# Patient Record
Sex: MALE | Race: WHITE | NOT HISPANIC OR LATINO | Employment: UNEMPLOYED | URBAN - NONMETROPOLITAN AREA
[De-identification: names, ages, dates, MRNs, and addresses within clinical notes are randomized per-mention and may not be internally consistent; named-entity substitution may affect disease eponyms.]

---

## 2022-01-07 ENCOUNTER — HOSPITAL ENCOUNTER (EMERGENCY)
Facility: HOSPITAL | Age: 37
Discharge: HOME | End: 2022-01-07
Attending: STUDENT IN AN ORGANIZED HEALTH CARE EDUCATION/TRAINING PROGRAM | Admitting: STUDENT IN AN ORGANIZED HEALTH CARE EDUCATION/TRAINING PROGRAM

## 2022-01-07 VITALS
TEMPERATURE: 97.8 F | WEIGHT: 150 LBS | DIASTOLIC BLOOD PRESSURE: 88 MMHG | SYSTOLIC BLOOD PRESSURE: 139 MMHG | OXYGEN SATURATION: 100 % | RESPIRATION RATE: 18 BRPM | BODY MASS INDEX: 20.32 KG/M2 | HEART RATE: 112 BPM | HEIGHT: 72 IN

## 2022-01-07 DIAGNOSIS — R44.0 AUDITORY HALLUCINATIONS: Primary | ICD-10-CM

## 2022-01-07 DIAGNOSIS — R45.86 EMOTIONAL LABILITY: ICD-10-CM

## 2022-01-07 PROCEDURE — 99284 EMERGENCY DEPT VISIT MOD MDM: CPT
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
## 2022-01-07 NOTE — NURSING NOTE
Called and provided intake information to Dr Brasher .discharge orders received.RBVOx2. Patient and ED provider aware.

## 2022-01-07 NOTE — DISCHARGE INSTRUCTIONS
Please follow-up with outpatient psychiatry as discussed.  Denies a date return here for any new onset or worsening concerns.

## 2022-01-07 NOTE — NURSING NOTE
Full intake assessment completed Patient presented to ED desire information on outpatient services provided by [facility].Patient denies SI,HI,AVH,alcohol and drugs reported poor sleep and appetite. He said that when he was 14 years old he was told that he could have schizoaffective disorder.In the past he has has had AVH.His parents asked him to come to the hospital today for help.He does not want to have labs performed today and instead elected to go home Educated on the benefits of seeking help verbalized understanding.

## 2022-01-12 ENCOUNTER — OFFICE VISIT (OUTPATIENT)
Dept: PSYCHIATRY | Facility: CLINIC | Age: 37
End: 2022-01-12

## 2022-01-12 VITALS
HEIGHT: 72 IN | DIASTOLIC BLOOD PRESSURE: 87 MMHG | HEART RATE: 96 BPM | OXYGEN SATURATION: 97 % | WEIGHT: 148.6 LBS | TEMPERATURE: 97.1 F | SYSTOLIC BLOOD PRESSURE: 155 MMHG | BODY MASS INDEX: 20.13 KG/M2

## 2022-01-12 DIAGNOSIS — F25.0 SCHIZOAFFECTIVE DISORDER, BIPOLAR TYPE: Primary | ICD-10-CM

## 2022-01-12 DIAGNOSIS — F51.5 NIGHTMARES: ICD-10-CM

## 2022-01-12 PROCEDURE — 90792 PSYCH DIAG EVAL W/MED SRVCS: CPT

## 2022-01-12 RX ORDER — PRAZOSIN HYDROCHLORIDE 1 MG/1
1 CAPSULE ORAL NIGHTLY
Qty: 30 CAPSULE | Refills: 0 | Status: SHIPPED | OUTPATIENT
Start: 2022-01-12 | End: 2022-01-19

## 2022-01-12 RX ORDER — ARIPIPRAZOLE 5 MG/1
5 TABLET ORAL DAILY
Qty: 7 TABLET | Refills: 0 | Status: SHIPPED | OUTPATIENT
Start: 2022-01-12 | End: 2022-01-19

## 2022-01-13 NOTE — PROGRESS NOTES
"Subjective   Kenney Jacob is a 36 y.o. male who is here today for initial appointment to evaluate for medication options. Patients Father during appointment with patients permission.     Chief Complaint:  Depression, visual/auditory hallucinations, paranoia    HPI:  History of Present Illness   Patient identifies that he was diagnosed with schizoaffective disorder prior to the 2009 by a psychiatrist that was working for an \"oragnization.\"  Patient identifies that he has been utilizing nutritional supplements: \"Adaptogens and neurotropics to self medicate.\"  Patient identifies that he first noted depressive symptomology as a child and that his severity of depression varies at times.  He endorses current  insomnia, increased irritability, increased fatigue, decreased self-care, dysphoria, anhedonia, avolition.  He identifies that his worst depressive states he has went 7+ days without showering.  Patient unable to rate depression on a scale of 0-10 with 10 being the worst.  Patient identifies that he experiences anxiety and first noted that this began as a teen.  He identifies that he worries over things that he cannot change and he is always expecting the worst to occur.  Patient unable to place anxiety on a scale of 0-10 with 10 being worst.  Patient endorses development of manic-like symptomology that occurs during substance use and sobriety.  He identifies that his depressive is still more prominent however he does note a significant change in his behavior, his speech, and impulsivity.  Patient endorses auditory hallucinations.  Patient states, \"I am able to recognize that they are not real and label it.\"  Patient identifies that over time he has noticed that it is harder for him to differentiate and finds himself \" in a repetitive loop of conversation.\"  Patient's father also agrees with this increased conversation.  He endorses visual hallucinations that have gotten worse over the last 3 weeks.  Patient states, " "\"I have always noticed self peripherally however now I have started to notice ripples and textures like things are just not real, but I note that she just my mind.\"  He endorses paranoia.  His father identifies that he believes his paranoia has gotten significantly worse in the last 3 weeks.  He states, \"I feel like they are out to get me...  Or watching.\"  Patient's father states, \"it is like no matter what someone does to them he takes it the wrong way and then thinks that they are out to get him.\"  It is noted during interview patient at times during conversation is looking from side to side and behind him.  Patient endorses variant sleep patterns.  He identifies that some nights he sleeps 5 to 6 hours uninterrupted and some nights it is 2 to 3 hours.  Patient father reports increased social isolation.  Patient's father states that they have noted a significant change in behavior over the last 3 weeks stating \"it is like a daily roller coaster, some nights he is up all night sorting bolts in the garage, it took 2 hours to get him out of his room this morning to come to this appointment.\"  Patient states that the nightmares are the worst and that they are causing him significant distress and the inability to fall back asleep because he does not want to have another nightmare.  Patient reports that he is eating at least 2 meals per day however does note that he prefers to eat snack foods such as M&Ms.  Patient endorses that he has struggled significantly with anger and irritability throughout his entire life.  Patient states that he believes \" I have caused more difficulty on my mind and have tried to use drug forearms on line to help increase processing speed focusing on dopamine and norepinephrine but of not paid attention to FREYA... I have used elemental lithium which was not consistent...  I think I have underlying autoimmune disorders that makes this stuff worse.\"  Patient denies obsessions and compulsions.  " "Patient endorses previous history of trauma however does not elaborate further and does not wish to proceed at this time.  Patient denies self-harm.  Patient does endorse difficulty with attention and focus.  Patient denies any command hallucinations.  Patient adamantly denies SI and HI.    Past Psych History: Patient identifies that he was previously diagnosed with schizoaffective disorder by a psychiatrist or psychologist that was employed by the \" organization.\"  Unable to provide name provider or location.      Previous Psych Meds: Patient identifies that he has previously been placed on lithium    Substance Abuse: Patient endorses a significant history of substance use that has occurred intermittently throughout adulthood.  Patient is unable to provide specific durations or dates of use for noted substances.  Patient does endorse that in 2009 he was utilizing IV cocaine and heroin.  He identifies that he had a 6-month period alcohol abuse.  He endorses a intermittent 2-month duration of methamphetamine use.  Patient endorses current nicotine use.  Patient identifies that he has been's sober approximately 5 months.  Patient advised to quit utilizing his supplements 3 weeks ago.    Social History:    Patient was born in [place] to biological mom and dad.  Patient has has 10 brothers and sisters.  Patient did not graduate high school.  Patient has never been .  Patient has no children.  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  Patient moved from [place] 3 months ago to live with his mom and dad as they noted \" his worsening.\"  Patient identifies that he has previously been involved in \" and organization.\"  Patient endorses previous occurrence of incarcerations with unknown causes.  Patient denies any current pending legal matters.    Family Psychiatric History:  family history includes Bipolar disorder in his paternal uncle.    Medical/Surgical History:  Past " Medical History:   Diagnosis Date   • ADHD (attention deficit hyperactivity disorder)    • Anemia    • Anxiety    • Depression    • Schizoaffective disorder (HCC)    • Seizures (HCC)     Reported during times of substance use   • Substance abuse (HCC)      Past Surgical History:   Procedure Laterality Date   • FRACTURE SURGERY         Allergies   Allergen Reactions   • Acetaminophen Palpitations       Current Medications:   Current Outpatient Medications   Medication Sig Dispense Refill   • ARIPiprazole (Abilify) 5 MG tablet Take 1 tablet by mouth Daily for 30 days. 7 tablet 0   • prazosin (MINIPRESS) 1 MG capsule Take 1 capsule by mouth Every Night. 30 capsule 0     No current facility-administered medications for this visit.         Review of Systems   Constitutional: Positive for activity change, appetite change and fatigue.   HENT: Negative for drooling and sore throat.    Eyes: Negative for redness and visual disturbance.   Respiratory: Negative for chest tightness and shortness of breath.    Cardiovascular: Negative for chest pain and palpitations.   Gastrointestinal: Negative for abdominal pain and nausea.   Endocrine: Negative for polydipsia and polyuria.   Genitourinary: Negative for frequency and urgency.   Musculoskeletal: Negative for back pain and joint swelling.   Skin: Negative for pallor and rash.   Allergic/Immunologic: Negative for environmental allergies and immunocompromised state.   Neurological: Negative for dizziness, tremors, seizures, syncope, facial asymmetry, speech difficulty, weakness, light-headedness, numbness and headaches.   Hematological: Negative for adenopathy. Does not bruise/bleed easily.   Psychiatric/Behavioral: Positive for agitation, decreased concentration, dysphoric mood, hallucinations and sleep disturbance. Negative for behavioral problems, confusion, self-injury and suicidal ideas. The patient is nervous/anxious. The patient is not hyperactive.     denies HEENT,  "cardiovascular, respiratory, liver, renal, GI/, endocrine, neuro, DERM, hematology, immunology, musculoskeletal disorders.    Objective   Physical Exam  Vitals reviewed.   Constitutional:       Appearance: Normal appearance. He is normal weight.   HENT:      Nose: Nose normal.      Mouth/Throat:      Mouth: Mucous membranes are moist.      Pharynx: Oropharynx is clear.   Eyes:      Extraocular Movements: Extraocular movements intact.      Pupils: Pupils are equal, round, and reactive to light.   Cardiovascular:      Rate and Rhythm: Normal rate.   Pulmonary:      Effort: Pulmonary effort is normal.   Abdominal:      General: Abdomen is flat.   Musculoskeletal:         General: Normal range of motion.      Cervical back: Normal range of motion.   Skin:     General: Skin is warm and dry.   Neurological:      General: No focal deficit present.      Mental Status: He is alert and oriented to person, place, and time. Mental status is at baseline.   Psychiatric:         Attention and Perception: He perceives auditory and visual hallucinations.         Mood and Affect: Mood is anxious. Affect is labile.         Speech: Speech is tangential.         Behavior: Behavior is agitated. Behavior is cooperative.         Thought Content: Thought content is paranoid and delusional. Thought content does not include homicidal or suicidal ideation. Thought content does not include homicidal or suicidal plan.         Cognition and Memory: Cognition and memory normal.         Judgment: Judgment is impulsive.       Blood pressure 155/87, pulse 96, temperature 97.1 °F (36.2 °C), height 182.9 cm (72\"), weight 67.4 kg (148 lb 9.6 oz), SpO2 97 %.    Mental Status Exam:   Hygiene:   fair  Cooperation:  Suspicious  Eye Contact:  Poor  Psychomotor Behavior:  Restless  Affect:  Restricted  Hopelessness: Denies  Speech:  Rambling  Thought Process:  Disorganized and Tangential  Thought Content:  Normal and Mood congruent  Suicidal:  " None  Homicidal:  None  Hallucinations:  Auditory and Visual  Delusion:  Paranoid  Memory:  Intact  Orientation:  Person, Place, Time and Situation  Reliability:  fair  Insight:  Fair  Judgement:  Fair  Impulse Control:  Fair  Physical/Medical Issues:  No       Short-term goals: Patient will be compliant with clinic appointments.  Patient will be engaged in therapy, medication compliant with minimal side effects. Patient  will report decrease of symptoms and frequency.    Long-term goals: Patient will have minimal symptoms of  with continued medication management. Patient will be compliant with treatment and appointments.     Strengths: Support  Weaknesses: coping skills    PHQ-9 Depression Screening  Little interest or pleasure in doing things? 2   Feeling down, depressed, or hopeless? 2   Trouble falling or staying asleep, or sleeping too much? 3   Feeling tired or having little energy? 0   Poor appetite or overeating? 2   Feeling bad about yourself - or that you are a failure or have let yourself or your family down?     Trouble concentrating on things, such as reading the newspaper or watching television? 3   Moving or speaking so slowly that other people could have noticed? Or the opposite - being so fidgety or restless that you have been moving around a lot more than usual? 3   Thoughts that you would be better off dead, or of hurting yourself in some way? 0   PHQ-9 Total Score 15   If you checked off any problems, how difficult have these problems made it for you to do your work, take care of things at home, or get along with other people? Extremely dIfficult       MINO-7  Feeling nervous, anxious or on edge: More than half the days  Not being able to stop or control worrying: More than half the days  Worrying too much about different things: More than half the days  Trouble Relaxing: More than half the days  Being so restless that it is hard to sit still: Nearly every day  Feeling afraid as if something awful  might happen: Several days  Becoming easily annoyed or irritable: Nearly every day  MINO 7 Total Score: 15  If you checked any problems, how difficult have these problems made it for you to do your work, take care of things at home, or get along with other people: Very difficult    Kenney Jacob  reports that he has been smoking cigarettes. He has been smoking about 0.25 packs per day. He has never used smokeless tobacco.. I have educated him on the risk of diseases from using tobacco products such as cancer, COPD, heart disease and reproductive problems.     I advised him to quit and he is not willing to quit.    I spent 3  minutes counseling the patient.            Assessment/Plan   Problems Addressed this Visit     None      Visit Diagnoses     Schizoaffective disorder, bipolar type (HCC)    -  Primary    Relevant Medications    ARIPiprazole (Abilify) 5 MG tablet    Nightmares        Relevant Medications    ARIPiprazole (Abilify) 5 MG tablet    prazosin (MINIPRESS) 1 MG capsule      Diagnoses       Codes Comments    Schizoaffective disorder, bipolar type (HCC)    -  Primary ICD-10-CM: F25.0  ICD-9-CM: 295.70     Nightmares     ICD-10-CM: F51.5  ICD-9-CM: 307.47         -Patient declines UDS at this time  -Start Abilify 5 mg p.o. daily for mood  -Start prazosin 1 mg p.o. nightly for nightmares  -Medications at the pharmacy at this time      Discussed medication options.  Discussed increased risk of elevation in serum lipids, increase in hemoglobin A1c, increase in fasting glucose, weight gain.  Discussed the need for establishing a baseline and monitoring thereafter.  Patient declines baseline lab work at this time however is agreeable to get labs and approximately 3 months.  Discussed the risks, benefits, and side effects of the medication; client acknowledged and verbally consented.  Patient is aware to contact the [facility] with any worsening of symptom.  Patient is agreeable to go to the ER or call 911  should they begin SI/HI.    SUPPORTIVE PSYCHOTHERAPY: continuing efforts to promote the therapeutic alliance, address the patient’s issues, and strengthen self awareness, insights, and coping skills.  Assisted patient in processing above session content; acknowledged and normalized patient’s thoughts, feelings, and concerns.  Applied  positive coping skills and behavior management in session.Allowed patient to freely discuss issues without interruption or judgment. Provided safe, confidential environment to facilitate the development of positive therapeutic relationship and encourage open, honest communication. Assisted patient in identifying risk factors which would indicate the need for higher level of care including thoughts to harm self or others and/or self-harming behavior and encouraged patient to contact this office, call 911, or present to the nearest emergency room should any of these events occur. Discussed crisis intervention services and means to access.  Patient adamantly and convincingly denies current suicidal or homicidal ideation or perceptual disturbance.    Return in about 1 week (around 1/19/2022), or if symptoms worsen or fail to improve, for Next scheduled follow up.          This document has been electronically signed by RAEN Frederick   January 13, 2022 17:48 EST   Errors in dictation may reflect use of voice recognition software and not all errors in transcription may have been detected prior to signing.

## 2022-01-19 ENCOUNTER — OFFICE VISIT (OUTPATIENT)
Dept: PSYCHIATRY | Facility: CLINIC | Age: 37
End: 2022-01-19

## 2022-01-19 VITALS
SYSTOLIC BLOOD PRESSURE: 136 MMHG | HEART RATE: 92 BPM | WEIGHT: 152 LBS | DIASTOLIC BLOOD PRESSURE: 84 MMHG | BODY MASS INDEX: 20.59 KG/M2 | HEIGHT: 72 IN

## 2022-01-19 DIAGNOSIS — F25.0 SCHIZOAFFECTIVE DISORDER, BIPOLAR TYPE: Primary | ICD-10-CM

## 2022-01-19 PROCEDURE — 99214 OFFICE O/P EST MOD 30 MIN: CPT

## 2022-01-19 RX ORDER — RISPERIDONE 0.25 MG/1
0.25 TABLET ORAL DAILY
Qty: 14 TABLET | Refills: 0 | Status: SHIPPED | OUTPATIENT
Start: 2022-01-19 | End: 2022-02-02 | Stop reason: SDUPTHER

## 2022-01-19 NOTE — PROGRESS NOTES
"      Subjective   Kenney Jacob is a 36 y.o. male is here today for medication management follow-up.    Chief Complaint:  Schizoaffective    History of Present Illness: Patient reports that he trialed the medications for 2 days. He reports that he believes that the Abilify caused an increase his \"systemic inflammation the following morning.\" He reports that he believes the prazosin resulted in orthostatic hypotension as he reports \"noted dizziness and vision changes upon standing that improved momentarily. He reports \"I believe that the years of substance use, head injury, and \"dendrite repair.\"  He reports continued paranoia, but reports an improvement in \"voices\" and \"manic like behavior.\" He identifies \" I had to make myself come today as I don't like the thought of any medications...however it is getting harder for me to thoughtfully control the differentiation of loop conversations.\" He reports improvement in insomnia, decrease in racing thoughts, no change in fatigue, mild improvement in self care, dysphoria, anhedonia, avolition. Patient unable to rate depression on a scale of 0-10 with 10 being the worst. He endorses continued thoughts of guilt associated about the past. He endorses worries associated with the past and of things that are out of his control.  Patient unable to place anxiety on a scale of 0-10 with 10 being worst. He reports a reduced in goal directed activity and hyper focus. He reports reduction in visual hallucinations and auditory hallucinations. He reports no change in paranoia and identifies that he is still experiencing significant deficits associated with it. He reports an improvement in sleep patterns averaging approximate 5-6 hours per night; patient continues to note the occurrence of distressing nightmares.  He reports a mild decrease in social isolation. Patient reports that he is eating at least 2 meals per day. He reports that he is \"focusing on nutritional wellness.\" Body mass " index is 20.61 kg/m². Patient denies the occurrence of panic. Patient denies obsessions and compulsions.  Patient endorses previous history of trauma however does not elaborate further and does not wish to proceed at this time.  Patient denies self-harm.  Patient does endorse difficulty with attention and focus.  Patient denies any command hallucinations.  Patient adamantly denies SI and HI.    The following portions of the patient's history were reviewed and updated as appropriate: allergies, current medications, past family history, past medical history, past social history, past surgical history and problem list.    Review of Systems   Constitutional: Positive for activity change, appetite change and fatigue.   HENT: Negative for drooling and tinnitus.    Eyes: Negative for photophobia, redness and visual disturbance.   Respiratory: Negative for chest tightness and shortness of breath.    Cardiovascular: Negative for chest pain and palpitations.   Gastrointestinal: Negative for abdominal pain, diarrhea and vomiting.   Endocrine: Negative for polydipsia and polyuria.   Genitourinary: Negative for frequency and urgency.   Musculoskeletal: Positive for arthralgias. Negative for gait problem.   Allergic/Immunologic: Negative for environmental allergies and immunocompromised state.   Neurological: Negative for dizziness, tremors, seizures, syncope, facial asymmetry, speech difficulty, weakness, light-headedness, numbness and headaches.   Hematological: Negative for adenopathy. Does not bruise/bleed easily.   Psychiatric/Behavioral: Positive for behavioral problems, decreased concentration, dysphoric mood and hallucinations. Negative for agitation, confusion, self-injury, sleep disturbance and suicidal ideas. The patient is not nervous/anxious and is not hyperactive.        Objective   Physical Exam  Vitals reviewed.   Constitutional:       Appearance: Normal appearance. He is normal weight.   HENT:      Head:  "Normocephalic.      Nose: Nose normal.      Mouth/Throat:      Mouth: Mucous membranes are moist.      Pharynx: Oropharynx is clear.   Eyes:      Extraocular Movements: Extraocular movements intact.      Conjunctiva/sclera: Conjunctivae normal.      Pupils: Pupils are equal, round, and reactive to light.   Cardiovascular:      Rate and Rhythm: Normal rate.   Pulmonary:      Effort: Pulmonary effort is normal.   Musculoskeletal:         General: Normal range of motion.      Cervical back: Normal range of motion.   Skin:     General: Skin is warm and dry.   Neurological:      General: No focal deficit present.      Mental Status: He is alert and oriented to person, place, and time. Mental status is at baseline.   Psychiatric:         Attention and Perception: Attention normal.         Mood and Affect: Mood is anxious. Affect is tearful.         Speech: Speech is tangential.         Behavior: Behavior is cooperative.         Thought Content: Thought content is paranoid. Thought content does not include homicidal or suicidal ideation. Thought content does not include homicidal or suicidal plan.         Cognition and Memory: Cognition and memory normal.         Judgment: Judgment is impulsive.       Blood pressure 136/84, pulse 92, height 182.9 cm (72\"), weight 68.9 kg (152 lb).    Medication List:   Current Outpatient Medications   Medication Sig Dispense Refill   • risperiDONE (RisperDAL) 0.25 MG tablet Take 1 tablet by mouth Daily. 14 tablet 0     No current facility-administered medications for this visit.       Mental Status Exam:   Hygiene:   fair  Cooperation:  cooperative, but suspicious  Eye Contact:  Fair  Psychomotor Behavior:  Restless  Affect:  Restricted  Hopelessness: Optimistic  Speech:  Rambling  Thought Process:  Tangential  Thought Content:  Bizarre and Mood congruent  Suicidal:  None  Homicidal:  None  Hallucinations:  Not demonstrated today  Delusion:  Paranoid  Memory:  Intact  Orientation:  Person, " Place, Time and Situation  Reliability:  fair  Insight:  Fair  Judgement:  Fair  Impulse Control:  Fair  Physical/Medical Issues:  No       Kenney Jacob  reports that he has been smoking cigarettes. He has been smoking about 0.25 packs per day. He has never used smokeless tobacco.. I have educated him on the risk of diseases from using tobacco products such as cancer, COPD and heart disease.     I advised him to quit and he is not willing to quit.    I spent 3  minutes counseling the patient.      Assessment/Plan   Problems Addressed this Visit     None      Visit Diagnoses     Schizoaffective disorder, bipolar type (HCC)    -  Primary    Relevant Medications    risperiDONE (RisperDAL) 0.25 MG tablet      Diagnoses       Codes Comments    Schizoaffective disorder, bipolar type (HCC)    -  Primary ICD-10-CM: F25.0  ICD-9-CM: 295.70           -Discontinue Abilify related to patient concern of increased systemic inflammation and patient request.  -Discontinue prazosin related to the reported occurrence of orthostatic hypotension and patient request.  -Start Risperdal 0.25 mg PO QHS for schizoaffective disorder at lowest dose and increase slowly to desired response per patient request.     Discussed medication options.  Reviewed the risks, benefits, and side effects of the medications; patient acknowledged and verbally consented.  Patient is agreeable to call the [facility].  Patient is aware to call 911 or go to the nearest ER should begin having SI/HI.       SUPPORTIVE PSYCHOTHERAPY: continuing efforts to promote the therapeutic alliance, address the patient’s issues, and strengthen self awareness, insights, and coping skills.  Assisted patient in processing above session content; acknowledged and normalized patient’s thoughts, feelings, and concerns.  Applied  positive coping skills and behavior management in session.Allowed patient to freely discuss issues without interruption or judgment. Provided safe,  confidential environment to facilitate the development of positive therapeutic relationship and encourage open, honest communication. Assisted patient in identifying risk factors which would indicate the need for higher level of care including thoughts to harm self or others and/or self-harming behavior and encouraged patient to contact this office, call 911, or present to the nearest emergency room should any of these events occur. Discussed crisis intervention services and means to access.  Patient adamantly and convincingly denies current suicidal or homicidal ideation or perceptual disturbance.    Return in about 2 weeks (around 2/2/2022), or if symptoms worsen or fail to improve, for Next scheduled follow up.            This document has been electronically signed by AREN Frederick  January 19, 2022 14:17 EST   Errors in dictation may reflect use of voice recognition software and not all errors in transcription may have been detected prior to signing.

## 2022-02-02 ENCOUNTER — OFFICE VISIT (OUTPATIENT)
Dept: PSYCHIATRY | Facility: CLINIC | Age: 37
End: 2022-02-02

## 2022-02-02 VITALS
WEIGHT: 152.6 LBS | HEART RATE: 91 BPM | DIASTOLIC BLOOD PRESSURE: 85 MMHG | TEMPERATURE: 96.6 F | HEIGHT: 72 IN | SYSTOLIC BLOOD PRESSURE: 131 MMHG | BODY MASS INDEX: 20.67 KG/M2 | OXYGEN SATURATION: 98 %

## 2022-02-02 DIAGNOSIS — F51.05 INSOMNIA DUE TO MENTAL CONDITION: Primary | ICD-10-CM

## 2022-02-02 DIAGNOSIS — F25.0 SCHIZOAFFECTIVE DISORDER, BIPOLAR TYPE: ICD-10-CM

## 2022-02-02 PROCEDURE — 99214 OFFICE O/P EST MOD 30 MIN: CPT

## 2022-02-02 RX ORDER — TRAZODONE HYDROCHLORIDE 50 MG/1
25 TABLET ORAL NIGHTLY
Qty: 30 TABLET | Refills: 0 | Status: SHIPPED | OUTPATIENT
Start: 2022-02-02 | End: 2022-03-22

## 2022-02-02 RX ORDER — RISPERIDONE 0.5 MG/1
0.25 TABLET ORAL DAILY
Qty: 14 TABLET | Refills: 0 | Status: SHIPPED | OUTPATIENT
Start: 2022-02-02 | End: 2022-03-22

## 2022-02-02 RX ORDER — DIVALPROEX SODIUM 250 MG/1
250 TABLET, EXTENDED RELEASE ORAL DAILY
Qty: 14 TABLET | Refills: 0 | Status: SHIPPED | OUTPATIENT
Start: 2022-02-02 | End: 2022-02-16 | Stop reason: SDUPTHER

## 2022-02-02 NOTE — PROGRESS NOTES
"      Subjective   Kenney Jacob is a 36 y.o. male is here today for medication management follow-up.    Chief Complaint:  Schizoaffective    History of Present Illness:   Patient reports that he has not noted any negative side effects with current regimen.  He reports that he is still utilizing home supplements of NAC and glycine.  He reports improvement mildly in auditory and visual hallucinations.  Patient reports that auditory hallucinations have decreased in severity and visual hallucinations are more noted in his peripheral vision.  He reports these hallucinations as chatter and shadows.  He states, \"I do not give a name to any of them.\"  He reports worsening in sleep pattern averaging approximately 3 hours per night.  He reports a mild improvement in irritability, but reports that when he is irritable it is typically himself.  Patient denies fatigue. Reports mild improvement in self-care, dysphoria and anhedonia and avolition. Patient unable to place anxiety on a scale of 0-10 with 10 being worst. He reports no change in paranoia and identifies that he is still experiencing significant deficits associated with it.  He reports that he is continuing to decrease social isolation and has been spending more time with his family.  Patient reports improvement in appetite averaging 2-3 meals per day. Body mass index is 20.7 kg/m². Patient denies the occurrence of panic. Patient denies obsessions and compulsions. Patient denies self-harm.  Patient does endorse difficulty with attention and focus.  Patient denies any command hallucinations.  Patient adamantly denies SI and HI.  Patient denies self-harm    The following portions of the patient's history were reviewed and updated as appropriate: allergies, current medications, past family history, past medical history, past social history, past surgical history and problem list.    Review of Systems   Constitutional: Positive for activity change, appetite change and " fatigue.   HENT: Negative for drooling and tinnitus.    Eyes: Negative for photophobia, redness and visual disturbance.   Respiratory: Negative for chest tightness and shortness of breath.    Cardiovascular: Negative for chest pain and palpitations.   Gastrointestinal: Negative for abdominal pain, diarrhea and vomiting.   Endocrine: Negative for polydipsia and polyuria.   Genitourinary: Negative for frequency and urgency.   Musculoskeletal: Positive for arthralgias. Negative for gait problem.   Allergic/Immunologic: Negative for environmental allergies and immunocompromised state.   Neurological: Negative for dizziness, tremors, seizures, syncope, facial asymmetry, speech difficulty, weakness, light-headedness, numbness and headaches.   Hematological: Negative for adenopathy. Does not bruise/bleed easily.   Psychiatric/Behavioral: Positive for behavioral problems, decreased concentration, dysphoric mood and hallucinations. Negative for agitation, confusion, self-injury, sleep disturbance and suicidal ideas. The patient is not nervous/anxious and is not hyperactive.        Objective   Physical Exam  Vitals reviewed.   Constitutional:       Appearance: Normal appearance. He is normal weight.   HENT:      Head: Normocephalic.      Nose: Nose normal.      Mouth/Throat:      Mouth: Mucous membranes are moist.      Pharynx: Oropharynx is clear.   Eyes:      Extraocular Movements: Extraocular movements intact.      Conjunctiva/sclera: Conjunctivae normal.      Pupils: Pupils are equal, round, and reactive to light.   Cardiovascular:      Rate and Rhythm: Normal rate.   Pulmonary:      Effort: Pulmonary effort is normal.   Musculoskeletal:         General: Normal range of motion.      Cervical back: Normal range of motion.   Skin:     General: Skin is warm and dry.   Neurological:      General: No focal deficit present.      Mental Status: He is alert and oriented to person, place, and time. Mental status is at baseline.  "  Psychiatric:         Attention and Perception: Attention normal.         Mood and Affect: Mood is anxious. Affect is tearful.         Speech: Speech is tangential.         Behavior: Behavior is cooperative.         Thought Content: Thought content is paranoid. Thought content does not include homicidal or suicidal ideation. Thought content does not include homicidal or suicidal plan.         Cognition and Memory: Cognition and memory normal.         Judgment: Judgment is impulsive.       Blood pressure 131/85, pulse 91, temperature 96.6 °F (35.9 °C), height 182.9 cm (72\"), weight 69.2 kg (152 lb 9.6 oz), SpO2 98 %.    Medication List:   Current Outpatient Medications   Medication Sig Dispense Refill   • risperiDONE (RisperDAL) 0.5 MG tablet Take 0.5 tablets by mouth Daily. 14 tablet 0   • divalproex (Depakote ER) 250 MG 24 hr tablet Take 1 tablet by mouth Daily for 14 days. 14 tablet 0   • traZODone (DESYREL) 50 MG tablet Take 0.5 tablets by mouth Every Night. 30 tablet 0     No current facility-administered medications for this visit.       Mental Status Exam:   Hygiene:   fair  Cooperation:  cooperative, but suspicious  Eye Contact:  Fair  Psychomotor Behavior:  Restless  Affect:  Restricted  Hopelessness: Optimistic  Speech:  Rambling  Thought Process:  Tangential  Thought Content:  Bizarre and Mood congruent  Suicidal:  None  Homicidal:  None  Hallucinations:  Not demonstrated today  Delusion:  Paranoid  Memory:  Intact  Orientation:  Person, Place, Time and Situation  Reliability:  fair  Insight:  Fair  Judgement:  Fair  Impulse Control:  Fair  Physical/Medical Issues:  No       Kenney Jacob  reports that he has been smoking cigarettes. He has been smoking about 0.25 packs per day. He has never used smokeless tobacco.. I have educated him on the risk of diseases from using tobacco products such as cancer, COPD and heart disease.     I advised him to quit and he is not willing to quit.    I spent 3  minutes " "counseling the patient.      Assessment/Plan   Problems Addressed this Visit     None      Visit Diagnoses     Insomnia due to mental condition    -  Primary    Relevant Medications    risperiDONE (RisperDAL) 0.5 MG tablet    traZODone (DESYREL) 50 MG tablet    Schizoaffective disorder, bipolar type (HCC)        Relevant Medications    risperiDONE (RisperDAL) 0.5 MG tablet    divalproex (Depakote ER) 250 MG 24 hr tablet    traZODone (DESYREL) 50 MG tablet      Diagnoses       Codes Comments    Insomnia due to mental condition    -  Primary ICD-10-CM: F51.05  ICD-9-CM: 300.9, 327.02     Schizoaffective disorder, bipolar type (HCC)     ICD-10-CM: F25.0  ICD-9-CM: 295.70             -Increase Risperdal 0.5 mg PO QHS for schizoaffective disorder at lowest dose and increase slowly to desired response per patient request.  -Start Depakote  mg PO QHS for mood at lowest dose and increase slowly to desired response per patient request.  -Start trazodone 25-50 mg PO QHS for sleep.   -Medication sent to pharmacy at this time.  -Patient declines labs at this time to establish baseline; reports that he will \"consider it next time.\"   -Medications sent to pharmacy at this time.     Discussed medication options.  Reviewed the risks, benefits, and side effects of the medications; patient acknowledged and verbally consented.  Patient is agreeable to call the [facility].  Patient is aware to call 911 or go to the nearest ER should begin having SI/HI.      SUPPORTIVE PSYCHOTHERAPY: continuing efforts to promote the therapeutic alliance, address the patient’s issues, and strengthen self awareness, insights, and coping skills.  Assisted patient in processing above session content; acknowledged and normalized patient’s thoughts, feelings, and concerns.  Applied  positive coping skills and behavior management in session.Allowed patient to freely discuss issues without interruption or judgment. Provided safe, confidential " environment to facilitate the development of positive therapeutic relationship and encourage open, honest communication. Assisted patient in identifying risk factors which would indicate the need for higher level of care including thoughts to harm self or others and/or self-harming behavior and encouraged patient to contact this office, call 911, or present to the nearest emergency room should any of these events occur. Discussed crisis intervention services and means to access.  Patient adamantly and convincingly denies current suicidal or homicidal ideation or perceptual disturbance.    Return in about 2 weeks (around 2/16/2022), or if symptoms worsen or fail to improve, for Next scheduled follow up.            This document has been electronically signed by AREN Frederick  February 2, 2022 17:22 EST   Errors in dictation may reflect use of voice recognition software and not all errors in transcription may have been detected prior to signing.

## 2022-02-04 ENCOUNTER — TELEPHONE (OUTPATIENT)
Dept: FAMILY MEDICINE CLINIC | Facility: CLINIC | Age: 37
End: 2022-02-04

## 2022-02-04 NOTE — TELEPHONE ENCOUNTER
Risperidone requires PA.  Proceed?     Submitted PA via CoverMyMeds.  Awaiting determination.     Received notification via fax from [employer], Risperidone 0.5mg has been approved 2/7/22-2/6/23.  Patient notified of determination.

## 2022-02-16 ENCOUNTER — OFFICE VISIT (OUTPATIENT)
Dept: PSYCHIATRY | Facility: CLINIC | Age: 37
End: 2022-02-16

## 2022-02-16 VITALS
WEIGHT: 153 LBS | OXYGEN SATURATION: 99 % | SYSTOLIC BLOOD PRESSURE: 145 MMHG | TEMPERATURE: 97 F | HEART RATE: 112 BPM | BODY MASS INDEX: 20.72 KG/M2 | HEIGHT: 72 IN | RESPIRATION RATE: 16 BRPM | DIASTOLIC BLOOD PRESSURE: 77 MMHG

## 2022-02-16 DIAGNOSIS — F51.05 INSOMNIA DUE TO MENTAL CONDITION: ICD-10-CM

## 2022-02-16 DIAGNOSIS — F25.0 SCHIZOAFFECTIVE DISORDER, BIPOLAR TYPE: ICD-10-CM

## 2022-02-16 PROCEDURE — 99214 OFFICE O/P EST MOD 30 MIN: CPT

## 2022-02-16 RX ORDER — DIVALPROEX SODIUM 500 MG/1
500 TABLET, EXTENDED RELEASE ORAL DAILY
Qty: 14 TABLET | Refills: 0 | Status: SHIPPED | OUTPATIENT
Start: 2022-02-16 | End: 2022-03-22

## 2022-02-16 NOTE — PROGRESS NOTES
"      Subjective   Kenney Jacob is a 36 y.o. male is here today for medication management follow-up.    Chief Complaint:  Schizoaffective    History of Present Illness:     Patient reports no negative side effects to current medication regimen.  He reports that he was able to go to the [facility] and attempt to get his 's license transitioned over for insurance purposes.  He identifies that when he got there it was full and he has scheduled an appointment to further pursue this.  He reports that he is still utilizing his home supplements of NAC and glycine.  He continues to report auditory and visual hallucinations: He reports \"chatter\" is still present but not as significant.  He reports improvement in sleep quality and duration with trazodone.  He identifies that he is averaging 7 to 8 hours plus per night without intermittent awakenings.  He reports mild improvement in irritability however identifies that there has been increase in difficulty in family dynamics at home.  He reports continued improvement in self-care.  He identifies that he did not  his risperidone as previously ordered from the pharmacy but plans to do so today.  Patient unable to place anxiety and depression on a scale of 0-10 with 10 being the worst.  He continues to endorse paranoia; however reports that he is still able to decipher that it is not real.  He reports no change in appetite averaging 1-2 meals per day. Body mass index is 20.75 kg/m².Patient denies the occurrence of panic. Patient denies obsessions and compulsions. Patient denies self-harm.  Patient continues to voice difficulty with attention and focus.  Patient denies any command hallucinations.  Patient adamantly denies SI and HI.  Patient denies self-harm    The following portions of the patient's history were reviewed and updated as appropriate: allergies, current medications, past family history, past medical history, past social history, past surgical history and " problem list.    Review of Systems   Constitutional: Positive for activity change and appetite change. Negative for fatigue.   HENT: Negative for drooling and tinnitus.    Eyes: Negative for photophobia, redness and visual disturbance.   Respiratory: Negative for chest tightness and shortness of breath.    Cardiovascular: Negative for chest pain and palpitations.   Gastrointestinal: Negative for abdominal pain, diarrhea and vomiting.   Endocrine: Negative for polydipsia and polyuria.   Genitourinary: Negative for frequency and urgency.   Musculoskeletal: Positive for arthralgias. Negative for gait problem.   Allergic/Immunologic: Negative for environmental allergies and immunocompromised state.   Neurological: Negative for dizziness, tremors, seizures, syncope, facial asymmetry, speech difficulty, weakness, light-headedness, numbness and headaches.   Hematological: Negative for adenopathy. Does not bruise/bleed easily.   Psychiatric/Behavioral: Positive for behavioral problems, decreased concentration, dysphoric mood, hallucinations and sleep disturbance. Negative for agitation, confusion, self-injury and suicidal ideas. The patient is not nervous/anxious and is not hyperactive.        Objective   Physical Exam  Vitals reviewed.   Constitutional:       Appearance: Normal appearance. He is normal weight.   HENT:      Head: Normocephalic.      Nose: Nose normal.      Mouth/Throat:      Mouth: Mucous membranes are moist.      Pharynx: Oropharynx is clear.   Eyes:      Extraocular Movements: Extraocular movements intact.      Conjunctiva/sclera: Conjunctivae normal.      Pupils: Pupils are equal, round, and reactive to light.   Cardiovascular:      Rate and Rhythm: Normal rate.   Pulmonary:      Effort: Pulmonary effort is normal.   Musculoskeletal:         General: Normal range of motion.      Cervical back: Normal range of motion.   Skin:     General: Skin is warm and dry.   Neurological:      General: No focal  "deficit present.      Mental Status: He is alert and oriented to person, place, and time. Mental status is at baseline.   Psychiatric:         Attention and Perception: Attention normal.         Mood and Affect: Mood is anxious. Affect is tearful.         Speech: Speech is tangential.         Behavior: Behavior is cooperative.         Thought Content: Thought content is paranoid. Thought content does not include homicidal or suicidal ideation. Thought content does not include homicidal or suicidal plan.         Cognition and Memory: Cognition and memory normal.         Judgment: Judgment is impulsive.       Blood pressure 145/77, pulse 112, temperature 97 °F (36.1 °C), temperature source Temporal, resp. rate 16, height 182.9 cm (72\"), weight 69.4 kg (153 lb), SpO2 99 %.    Medication List:   Current Outpatient Medications   Medication Sig Dispense Refill   • divalproex (Depakote ER) 500 MG 24 hr tablet Take 1 tablet by mouth Daily for 14 days. 14 tablet 0   • traZODone (DESYREL) 50 MG tablet Take 0.5 tablets by mouth Every Night. 30 tablet 0   • risperiDONE (RisperDAL) 0.5 MG tablet Take 0.5 tablets by mouth Daily. 14 tablet 0     No current facility-administered medications for this visit.       Mental Status Exam:   Hygiene:   fair  Cooperation:  Guarded  Eye Contact:  Fair  Psychomotor Behavior:  Restless  Affect:  Restricted  Hopelessness: Optimistic  Speech:  Rambling  Thought Process:  Tangential  Thought Content:  Bizarre and Mood congruent  Suicidal:  None  Homicidal:  None  Hallucinations:  Not demonstrated today  Delusion:  Paranoid  Memory:  Intact  Orientation:  Person, Place, Time and Situation  Reliability:  fair  Insight:  Fair  Judgement:  Fair  Impulse Control:  Fair  Physical/Medical Issues:  No       Kenney Jacob  reports that he has been smoking cigarettes. He has been smoking about 0.25 packs per day. He has never used smokeless tobacco.. I have educated him on the risk of diseases from using " tobacco products such as cancer, COPD and heart disease.     I advised him to quit and he is not willing to quit.    I spent 3  minutes counseling the patient.      Assessment/Plan   Problems Addressed this Visit     None      Visit Diagnoses     Schizoaffective disorder, bipolar type (HCC)        Relevant Medications    divalproex (Depakote ER) 500 MG 24 hr tablet    Insomnia due to mental condition          Diagnoses       Codes Comments    Schizoaffective disorder, bipolar type (HCC)     ICD-10-CM: F25.0  ICD-9-CM: 295.70     Insomnia due to mental condition     ICD-10-CM: F51.05  ICD-9-CM: 300.9, 327.02           -Continue to build therapeutic alliance and rapport  -Continue Risperdal 0.5 mg PO QHS for schizoaffective disorder at current doses patient has not began increase from previous visit..  -Increase Depakote ER to 500 mg PO QHS for mood at lowest dose and increase slowly to desired response per patient request.  -Continue trazodone 25-50 mg PO QHS as needed for sleep  -Patient declines labs at this time to establish baseline  -Medications sent to pharmacy at this time.     Discussed medication options.  Reviewed the risks, benefits, and side effects of the medications; patient acknowledged and verbally consented.  Patient is agreeable to call the [facility].  Patient is aware to call 911 or go to the nearest ER should begin having SI/HI.      SUPPORTIVE PSYCHOTHERAPY: continuing efforts to promote the therapeutic alliance, address the patient’s issues, and strengthen self awareness, insights, and coping skills.  Assisted patient in processing above session content; acknowledged and normalized patient’s thoughts, feelings, and concerns.  Applied  positive coping skills and behavior management in session.Allowed patient to freely discuss issues without interruption or judgment. Provided safe, confidential environment to facilitate the development of positive therapeutic relationship and encourage open,  honest communication. Assisted patient in identifying risk factors which would indicate the need for higher level of care including thoughts to harm self or others and/or self-harming behavior and encouraged patient to contact this office, call 911, or present to the nearest emergency room should any of these events occur. Discussed crisis intervention services and means to access.  Patient adamantly and convincingly denies current suicidal or homicidal ideation or perceptual disturbance.    Return in about 2 weeks (around 3/2/2022), or if symptoms worsen or fail to improve.            This document has been electronically signed by AREN Frederick  February 16, 2022 17:11 EST   Errors in dictation may reflect use of voice recognition software and not all errors in transcription may have been detected prior to signing.

## 2022-03-22 ENCOUNTER — OFFICE VISIT (OUTPATIENT)
Dept: PSYCHIATRY | Facility: CLINIC | Age: 37
End: 2022-03-22

## 2022-03-22 VITALS
HEART RATE: 101 BPM | SYSTOLIC BLOOD PRESSURE: 143 MMHG | OXYGEN SATURATION: 99 % | TEMPERATURE: 96.9 F | BODY MASS INDEX: 19.72 KG/M2 | DIASTOLIC BLOOD PRESSURE: 86 MMHG | WEIGHT: 145.6 LBS | HEIGHT: 72 IN

## 2022-03-22 DIAGNOSIS — F25.0 SCHIZOAFFECTIVE DISORDER, BIPOLAR TYPE: Primary | ICD-10-CM

## 2022-03-22 PROCEDURE — 99214 OFFICE O/P EST MOD 30 MIN: CPT

## 2022-03-22 RX ORDER — OLANZAPINE 5 MG/1
5 TABLET ORAL NIGHTLY
Qty: 15 TABLET | Refills: 0 | Status: SHIPPED | OUTPATIENT
Start: 2022-03-22 | End: 2022-04-21 | Stop reason: SDUPTHER

## 2022-03-22 RX ORDER — OLANZAPINE 5 MG/1
5 TABLET ORAL NIGHTLY
Qty: 15 TABLET | Refills: 0 | Status: SHIPPED | OUTPATIENT
Start: 2022-03-22 | End: 2022-03-22 | Stop reason: SDUPTHER

## 2022-03-22 RX ORDER — DIVALPROEX SODIUM 500 MG/1
500 TABLET, EXTENDED RELEASE ORAL DAILY
Qty: 15 TABLET | Refills: 0 | Status: SHIPPED | OUTPATIENT
Start: 2022-03-22 | End: 2022-04-21 | Stop reason: SDUPTHER

## 2022-03-22 NOTE — PROGRESS NOTES
"      Subjective   Kenney Jacob is a 36 y.o. male is here today for medication management follow-up.    Chief Complaint:  Schizoaffective    History of Present Illness:     Patient reports increased difficulty with strained family dynamics.  He reports that he has not been taking his medication: He reports that he did not like how the risperidone made him feel but did like the Depakote.  He reports increased feelings of mood lability, irritability, and conflict at home.  He reports worsening in sleep habits and reports that he is slept 7 hours in the last 5 days.  He reports a decrease in appetite averaging 1-2 meals per day.  8 pound weight loss noted since last encounter. Body mass index is 19.75 kg/m². He reports increased goal-directed activity on projects around his parents home.  He reports \"I have just got lots of things to get done and do.\"  He reports that financial stressors have been the cause of conflict at home.  He reports \" I know that some of the stuff that comes to mind is not real but I am having a hard time not feeling like my family is throwing angles at me.\"  Patient reports increased paranoia.  He reports heightened auditory hallucinations and sensitivity to background noise.  He continues to describe auditory hallucinations as \"chatter.\"  Patient unable to place anxiety and depression on a scale 0-10 with 10 being the worst.  Patient adamantly denies self-harm.  Patient denies visual hallucinations.  Patient denies command hallucinations.  Patient adamantly denies SI and HI.    The following portions of the patient's history were reviewed and updated as appropriate: allergies, current medications, past family history, past medical history, past social history, past surgical history and problem list.    Review of Systems   Constitutional: Positive for activity change and appetite change. Negative for fatigue and unexpected weight change.   HENT: Negative for drooling and tinnitus.    Eyes: " Negative for photophobia, redness and visual disturbance.   Respiratory: Negative for chest tightness and shortness of breath.    Cardiovascular: Negative for chest pain and palpitations.   Gastrointestinal: Negative for abdominal pain, diarrhea and vomiting.   Endocrine: Negative for polydipsia and polyuria.   Genitourinary: Negative for frequency and urgency.   Musculoskeletal: Positive for arthralgias. Negative for gait problem.   Allergic/Immunologic: Negative for environmental allergies and immunocompromised state.   Neurological: Negative for dizziness, tremors, seizures, syncope, facial asymmetry, speech difficulty, weakness, light-headedness, numbness and headaches.   Hematological: Negative for adenopathy. Does not bruise/bleed easily.   Psychiatric/Behavioral: Positive for agitation, behavioral problems, decreased concentration, dysphoric mood, hallucinations and sleep disturbance. Negative for confusion, self-injury and suicidal ideas. The patient is not nervous/anxious and is not hyperactive.        Objective   Physical Exam  Vitals reviewed.   Constitutional:       Appearance: Normal appearance. He is normal weight.   HENT:      Head: Normocephalic.      Nose: Nose normal.      Mouth/Throat:      Mouth: Mucous membranes are moist.      Pharynx: Oropharynx is clear.   Eyes:      Extraocular Movements: Extraocular movements intact.      Conjunctiva/sclera: Conjunctivae normal.      Pupils: Pupils are equal, round, and reactive to light.   Cardiovascular:      Rate and Rhythm: Normal rate.   Pulmonary:      Effort: Pulmonary effort is normal.   Musculoskeletal:         General: Normal range of motion.      Cervical back: Normal range of motion.   Skin:     General: Skin is warm and dry.   Neurological:      General: No focal deficit present.      Mental Status: He is alert and oriented to person, place, and time. Mental status is at baseline.   Psychiatric:         Attention and Perception: Attention  "normal. He perceives auditory hallucinations.         Mood and Affect: Mood is anxious. Affect is not tearful.         Speech: Speech is tangential.         Behavior: Behavior is cooperative.         Thought Content: Thought content is paranoid. Thought content does not include homicidal or suicidal ideation. Thought content does not include homicidal or suicidal plan.         Cognition and Memory: Cognition and memory normal.         Judgment: Judgment is impulsive.       Blood pressure 143/86, pulse 101, temperature 96.9 °F (36.1 °C), height 182.9 cm (72\"), weight 66 kg (145 lb 9.6 oz), SpO2 99 %.    Medication List:   Current Outpatient Medications   Medication Sig Dispense Refill   • divalproex (Depakote ER) 500 MG 24 hr tablet Take 1 tablet by mouth Daily for 15 days. 15 tablet 0   • OLANZapine (ZyPREXA) 5 MG tablet Take 1 tablet by mouth Every Night for 15 days. 15 tablet 0     No current facility-administered medications for this visit.       Mental Status Exam:   Hygiene:   fair  Cooperation:  Guarded  Eye Contact:  Fair  Psychomotor Behavior:  Restless  Affect:  Restricted  Hopelessness: Optimistic  Speech:  Rambling  Thought Process:  Tangential  Thought Content:  Bizarre and Mood congruent  Suicidal:  None  Homicidal:  None  Hallucinations:  Not demonstrated today  Delusion:  Paranoid  Memory:  Intact  Orientation:  Person, Place, Time and Situation  Reliability:  fair  Insight:  Fair  Judgement:  Fair  Impulse Control:  Fair  Physical/Medical Issues:  No       Kenney Jacob  reports that he has been smoking cigarettes. He has been smoking about 0.25 packs per day. He has never used smokeless tobacco.. I have educated him on the risk of diseases from using tobacco products such as cancer, COPD and heart disease.     I advised him to quit and he is not willing to quit.    I spent 3  minutes counseling the patient.      Assessment/Plan   Problems Addressed this Visit    None     Visit Diagnoses     " Schizoaffective disorder, bipolar type (HCC)    -  Primary    Relevant Medications    divalproex (Depakote ER) 500 MG 24 hr tablet    OLANZapine (ZyPREXA) 5 MG tablet      Diagnoses       Codes Comments    Schizoaffective disorder, bipolar type (HCC)    -  Primary ICD-10-CM: F25.0  ICD-9-CM: 295.70           -Continue to build therapeutic alliance and rapport  -Discontinue Risperdal per patient request  -Start Zyprexa 5 mg p.o. nightly for mood  -Restart Depakote ER to 500 mg PO QHS for mood per patient request, patient reports that he felt like that it was helping overall.  -Discontinue trazodone per patient request  -Patient declines labs at this time to establish baseline  -Medications sent to pharmacy at this time.     Discussed medication and psychotherapy options.  Thoroughly discussed increased risk of weight gain, increased fasting glucose, increased serum A1c, increased serum lipids, risk for abnormal muscle movements, and or weight gain with the use of SGA's.  Reviewed the risks, benefits, and side effects of the medications; patient acknowledged and verbally consented.  Patient is agreeable to call the [facility].  Patient is aware to call 911 or go to the nearest ER should begin having SI/HI.      SUPPORTIVE PSYCHOTHERAPY: continuing efforts to promote the therapeutic alliance, address the patient’s issues, and strengthen self awareness, insights, and coping skills.  Assisted patient in processing above session content; acknowledged and normalized patient’s thoughts, feelings, and concerns.  Applied  positive coping skills and behavior management in session.Allowed patient to freely discuss issues without interruption or judgment. Provided safe, confidential environment to facilitate the development of positive therapeutic relationship and encourage open, honest communication. Assisted patient in identifying risk factors which would indicate the need for higher level of care including thoughts to  harm self or others and/or self-harming behavior and encouraged patient to contact this office, call 911, or present to the nearest emergency room should any of these events occur. Discussed crisis intervention services and means to access.  Patient adamantly and convincingly denies current suicidal or homicidal ideation or perceptual disturbance.    Return in about 4 weeks (around 4/19/2022), or if symptoms worsen or fail to improve, for Next scheduled follow up.            This document has been electronically signed by AREN Frederick  March 22, 2022 15:25 EDT   Errors in dictation may reflect use of voice recognition software and not all errors in transcription may have been detected prior to signing.

## 2022-04-21 ENCOUNTER — OFFICE VISIT (OUTPATIENT)
Dept: PSYCHIATRY | Facility: CLINIC | Age: 37
End: 2022-04-21

## 2022-04-21 VITALS
HEART RATE: 93 BPM | TEMPERATURE: 97.8 F | DIASTOLIC BLOOD PRESSURE: 81 MMHG | OXYGEN SATURATION: 99 % | SYSTOLIC BLOOD PRESSURE: 118 MMHG | WEIGHT: 153.4 LBS | HEIGHT: 72 IN | BODY MASS INDEX: 20.78 KG/M2

## 2022-04-21 DIAGNOSIS — F25.0 SCHIZOAFFECTIVE DISORDER, BIPOLAR TYPE: ICD-10-CM

## 2022-04-21 PROCEDURE — 99214 OFFICE O/P EST MOD 30 MIN: CPT

## 2022-04-21 RX ORDER — DIVALPROEX SODIUM 500 MG/1
1000 TABLET, EXTENDED RELEASE ORAL DAILY
Qty: 60 TABLET | Refills: 0 | Status: SHIPPED | OUTPATIENT
Start: 2022-04-21 | End: 2022-05-11 | Stop reason: SDUPTHER

## 2022-04-21 RX ORDER — OLANZAPINE 5 MG/1
TABLET ORAL
Qty: 53 TABLET | Refills: 0 | Status: SHIPPED | OUTPATIENT
Start: 2022-04-21 | End: 2022-05-11 | Stop reason: SDUPTHER

## 2022-04-21 NOTE — PROGRESS NOTES
"      Subjective   Kenney Jacob is a 36 y.o. male is here today for medication management follow-up.    Chief Complaint:  Schizoaffective: Bipolar type    History of Present Illness:     Patient reports that he did not note negative side effects with Depakote and Zyprexa.  He reports that he did notice a significant improvement in auditory and visual hallucinations as well as paranoia when taking the Zyprexa.  He reports that he took it for approximately 7 days and then lost the bottle.  He identifies that sleep was improved and that he felt that it also helped his \"restless legs.\"  He reports that with Depakote he feels that his mood is more stable and that irritability is decreased.  He reports that since last encounter he has had a very difficult time with \"delusions and hallucinations.\"  He reports that he is often caught himself speaking to people and to animals that he is aware that are not there and then expresses great upset related to this.  He states \"my brain thinks that the real, I know that they are not real, then I catch myself responding, then I get mad because I know it is not real.\"  He reports that he has had an increase in paranoia behaviors and has felt as if he is being followed and watched.  Upon entering the room patient states \"do you have all of your recording devices off... I know you are not recording me but my brain tells me you are so I am just asking to be sure.\" He reports heightened auditory hallucinations and sensitivity to background noise.  He identifies that background noise is singing, white noise, and conversation.  He identifies that since last encounter he has stopped taking all \"adaptogens.\"  He reports that appetite has also improved.  8 pound weight gain documented since last encounter. Body mass index is 20.8 kg/m².  He identifies that miguel angel symptomology has decreased, denies depressive symptomology, reports euthymia. Patient unable to place anxiety and depression on a " scale 0-10 with 10 being the worst.  Patient adamantly denies self-harm. Patient denies command hallucinations.  Patient adamantly denies SI and HI.    The following portions of the patient's history were reviewed and updated as appropriate: allergies, current medications, past family history, past medical history, past social history, past surgical history and problem list.    Review of Systems   Constitutional: Positive for activity change and appetite change. Negative for fatigue and unexpected weight change.   HENT: Negative for drooling and tinnitus.    Eyes: Negative for photophobia, redness and visual disturbance.   Respiratory: Negative for chest tightness and shortness of breath.    Cardiovascular: Negative for chest pain and palpitations.   Gastrointestinal: Negative for abdominal pain, diarrhea and vomiting.   Endocrine: Negative for polydipsia and polyuria.   Genitourinary: Negative for frequency and urgency.   Musculoskeletal: Positive for arthralgias. Negative for gait problem.   Allergic/Immunologic: Negative for environmental allergies and immunocompromised state.   Neurological: Negative for dizziness, tremors, seizures, syncope, facial asymmetry, speech difficulty, weakness, light-headedness, numbness and headaches.   Hematological: Negative for adenopathy. Does not bruise/bleed easily.   Psychiatric/Behavioral: Positive for agitation, behavioral problems, decreased concentration, dysphoric mood, hallucinations and sleep disturbance. Negative for confusion, self-injury and suicidal ideas. The patient is not nervous/anxious and is not hyperactive.        Objective   Physical Exam  Vitals reviewed.   Constitutional:       Appearance: Normal appearance. He is normal weight.   HENT:      Head: Normocephalic.      Nose: Nose normal.      Mouth/Throat:      Mouth: Mucous membranes are moist.      Pharynx: Oropharynx is clear.   Eyes:      Extraocular Movements: Extraocular movements intact.       "Conjunctiva/sclera: Conjunctivae normal.      Pupils: Pupils are equal, round, and reactive to light.   Cardiovascular:      Rate and Rhythm: Normal rate.   Pulmonary:      Effort: Pulmonary effort is normal.   Musculoskeletal:         General: Normal range of motion.      Cervical back: Normal range of motion.   Skin:     General: Skin is warm and dry.   Neurological:      General: No focal deficit present.      Mental Status: He is alert and oriented to person, place, and time. Mental status is at baseline.   Psychiatric:         Attention and Perception: Attention normal. He perceives auditory hallucinations.         Mood and Affect: Mood is anxious. Affect is not tearful.         Speech: Speech is tangential.         Behavior: Behavior is cooperative.         Thought Content: Thought content is paranoid. Thought content does not include homicidal or suicidal ideation. Thought content does not include homicidal or suicidal plan.         Cognition and Memory: Cognition and memory normal.         Judgment: Judgment is impulsive.       Blood pressure 118/81, pulse 93, temperature 97.8 °F (36.6 °C), height 182.9 cm (72.01\"), weight 69.6 kg (153 lb 6.4 oz), SpO2 99 %.    Medication List:   Current Outpatient Medications   Medication Sig Dispense Refill   • divalproex (Depakote ER) 500 MG 24 hr tablet Take 2 tablets by mouth Daily for 30 days. 60 tablet 0   • OLANZapine (ZyPREXA) 5 MG tablet Take 1 tablet by mouth Every Night for 7 days, THEN 2 tablets Every Night for 23 days. 53 tablet 0     No current facility-administered medications for this visit.       Mental Status Exam:   Hygiene:   fair  Cooperation:  Guarded  Eye Contact:  Fair  Psychomotor Behavior:  Restless  Affect:  Restricted  Hopelessness: Optimistic  Speech:  Rambling  Thought Process:  Tangential  Thought Content:  Bizarre and Mood congruent  Suicidal:  None  Homicidal:  None  Hallucinations:  Not demonstrated today  Delusion:  Paranoid  Memory:  " Intact  Orientation:  Person, Place, Time and Situation  Reliability:  fair  Insight:  Fair  Judgement:  Fair  Impulse Control:  Fair  Physical/Medical Issues:  No       Kenney Jacob  reports that he has been smoking cigarettes. He has been smoking about 0.25 packs per day. He has never used smokeless tobacco.. I have educated him on the risk of diseases from using tobacco products such as cancer, COPD and heart disease.     I advised him to quit and he is not willing to quit.    I spent 3  minutes counseling the patient.      Assessment/Plan   Problems Addressed this Visit    None     Visit Diagnoses     Schizoaffective disorder, bipolar type (HCC)        Relevant Medications    divalproex (Depakote ER) 500 MG 24 hr tablet    OLANZapine (ZyPREXA) 5 MG tablet      Diagnoses       Codes Comments    Schizoaffective disorder, bipolar type (HCC)     ICD-10-CM: F25.0  ICD-9-CM: 295.70           -Continue to build therapeutic alliance and rapport  -Restart Zyprexa 5 mg p.o. nightly for mood on 7 days and increase to 10 mg thereafter for mood  -Restart Depakote ER and increase to500 mg PO twice daily for mood   -Patient declines labs at this time to establish baseline  -Medications sent to pharmacy at this time.     Discussed medication and psychotherapy options.  Thoroughly discussed increased risk of weight gain, increased fasting glucose, increased serum A1c, increased serum lipids, risk for abnormal muscle movements, and or weight gain with the use of SGA's.  Reviewed the risks, benefits, and side effects of the medications; patient acknowledged and verbally consented.  Patient is agreeable to call the [facility].  Patient is aware to call 911 or go to the nearest ER should begin having SI/HI.      SUPPORTIVE PSYCHOTHERAPY: continuing efforts to promote the therapeutic alliance, address the patient’s issues, and strengthen self awareness, insights, and coping skills.  Assisted patient in processing above session  content; acknowledged and normalized patient’s thoughts, feelings, and concerns.  Applied  positive coping skills and behavior management in session.Allowed patient to freely discuss issues without interruption or judgment. Provided safe, confidential environment to facilitate the development of positive therapeutic relationship and encourage open, honest communication. Assisted patient in identifying risk factors which would indicate the need for higher level of care including thoughts to harm self or others and/or self-harming behavior and encouraged patient to contact this office, call 911, or present to the nearest emergency room should any of these events occur. Discussed crisis intervention services and means to access.  Patient adamantly and convincingly denies current suicidal or homicidal ideation or perceptual disturbance.    No follow-ups on file.        This document has been electronically signed by AREN Frederick  April 21, 2022 14:20 EDT   Errors in dictation may reflect use of voice recognition software and not all errors in transcription may have been detected prior to signing.

## 2022-05-11 ENCOUNTER — OFFICE VISIT (OUTPATIENT)
Dept: PSYCHIATRY | Facility: CLINIC | Age: 37
End: 2022-05-11

## 2022-05-11 VITALS
BODY MASS INDEX: 22.08 KG/M2 | HEART RATE: 87 BPM | WEIGHT: 163 LBS | TEMPERATURE: 98 F | OXYGEN SATURATION: 97 % | DIASTOLIC BLOOD PRESSURE: 87 MMHG | HEIGHT: 72 IN | SYSTOLIC BLOOD PRESSURE: 131 MMHG

## 2022-05-11 DIAGNOSIS — F25.0 SCHIZOAFFECTIVE DISORDER, BIPOLAR TYPE: ICD-10-CM

## 2022-05-11 PROCEDURE — 99215 OFFICE O/P EST HI 40 MIN: CPT

## 2022-05-11 RX ORDER — DIVALPROEX SODIUM 500 MG/1
1000 TABLET, EXTENDED RELEASE ORAL DAILY
Qty: 60 TABLET | Refills: 0 | Status: SHIPPED | OUTPATIENT
Start: 2022-05-11 | End: 2022-05-26 | Stop reason: SDUPTHER

## 2022-05-11 RX ORDER — OLANZAPINE 10 MG/1
10 TABLET ORAL NIGHTLY
Qty: 30 TABLET | Refills: 0 | Status: SHIPPED | OUTPATIENT
Start: 2022-05-11 | End: 2022-05-26 | Stop reason: SDUPTHER

## 2022-05-11 NOTE — PROGRESS NOTES
"  Subjective   Kenney Jacob is a 36 y.o. male is here today for medication management follow-up.    Chief Complaint:  Schizoaffective: Bipolar type    History of Present Illness:     Patient reports that he did not note negative side effects with Depakote and Zyprexa.  He identifies that he has given his mother control of his medication to aid in compliance and to \"give my parents some peace of mind.\"  Patient identifies that he feels that his mind is not as \"disturbed.\"  He reports that he feels that he is able to \"deal with the delusional aspect better than before.\"  He says that auditory hallucinations remain present but are not as \"overtaking.\"  He reports that visual hallucinations have reduced in frequency but he is reported that they have been more detailed and has had difficulty distinguishing from hallucinations versus reality.  He reports that he is feeling that he is less productive than what he was prior.  Paranoia remains present but reports that he is able to redirect his thoughts more easily.  He reports that he has had an increase in appetite since taking his medication regularly.  He reports that he is eating 2-3 meals per day and feels that he is \"absorbing more food now.\"  10 pound weight gain documented since last encounter. Body mass index is 22.1 kg/m². He reports that sleep has maintained steady averaging 7 to 8 hours per night without intermittent awakenings.  He reports that nightmares have significantly reduced.  He identifies that anger and irritability have significantly reduced with Depakote and he reports that \"this is encouraging because I am getting along better with my family and am able to enjoy them.\"  Denies any current depressive symptomology.  Patient denies self-harm.  He denies command hallucinations.  Adamantly denies SI and HI.    The following portions of the patient's history were reviewed and updated as appropriate: allergies, current medications, past family history, " past medical history, past social history, past surgical history and problem list.    Review of Systems   Constitutional: Positive for activity change and appetite change. Negative for fatigue and unexpected weight change.   HENT: Negative for drooling and tinnitus.    Eyes: Negative for photophobia, redness and visual disturbance.   Respiratory: Negative for chest tightness and shortness of breath.    Cardiovascular: Negative for chest pain and palpitations.   Gastrointestinal: Negative for abdominal pain, diarrhea and vomiting.   Endocrine: Negative for polydipsia and polyuria.   Genitourinary: Negative for frequency and urgency.   Musculoskeletal: Positive for arthralgias. Negative for gait problem.   Allergic/Immunologic: Negative for environmental allergies and immunocompromised state.   Neurological: Negative for dizziness, tremors, seizures, syncope, facial asymmetry, speech difficulty, weakness, light-headedness, numbness and headaches.   Hematological: Negative for adenopathy. Does not bruise/bleed easily.   Psychiatric/Behavioral: Positive for decreased concentration and hallucinations. Negative for agitation, behavioral problems, confusion, dysphoric mood, self-injury, sleep disturbance and suicidal ideas. The patient is not nervous/anxious and is not hyperactive.        Objective   Physical Exam  Vitals reviewed.   Constitutional:       Appearance: Normal appearance. He is normal weight.   HENT:      Head: Normocephalic.      Nose: Nose normal.      Mouth/Throat:      Mouth: Mucous membranes are moist.      Pharynx: Oropharynx is clear.   Eyes:      Extraocular Movements: Extraocular movements intact.      Conjunctiva/sclera: Conjunctivae normal.      Pupils: Pupils are equal, round, and reactive to light.   Cardiovascular:      Rate and Rhythm: Normal rate.   Pulmonary:      Effort: Pulmonary effort is normal.   Musculoskeletal:         General: Normal range of motion.      Cervical back: Normal range  "of motion.   Skin:     General: Skin is warm and dry.   Neurological:      General: No focal deficit present.      Mental Status: He is alert and oriented to person, place, and time. Mental status is at baseline.   Psychiatric:         Attention and Perception: Attention normal. He perceives auditory hallucinations.         Mood and Affect: Mood is anxious. Affect is not tearful.         Speech: Speech is tangential.         Behavior: Behavior is cooperative.         Thought Content: Thought content is paranoid. Thought content does not include homicidal or suicidal ideation. Thought content does not include homicidal or suicidal plan.         Cognition and Memory: Cognition and memory normal.         Judgment: Judgment is impulsive.       Blood pressure 131/87, pulse 87, temperature 98 °F (36.7 °C), height 182.9 cm (72.01\"), weight 73.9 kg (163 lb), SpO2 97 %.    Medication List:   Current Outpatient Medications   Medication Sig Dispense Refill   • divalproex (Depakote ER) 500 MG 24 hr tablet Take 2 tablets by mouth Daily for 30 days. 60 tablet 0   • OLANZapine (ZyPREXA) 10 MG tablet Take 1 tablet by mouth Every Night for 30 days. 30 tablet 0     No current facility-administered medications for this visit.       Mental Status Exam:   Hygiene:   fair  Cooperation:  Guarded  Eye Contact:  Fair  Psychomotor Behavior:  Restless  Affect:  Restricted  Hopelessness: Optimistic  Speech:  Rambling  Thought Process:  Tangential  Thought Content:  Bizarre and Mood congruent  Suicidal:  None  Homicidal:  None  Hallucinations:  Not demonstrated today  Delusion:  Paranoid  Memory:  Intact  Orientation:  Person, Place, Time and Situation  Reliability:  fair  Insight:  Fair  Judgement:  Fair  Impulse Control:  Fair  Physical/Medical Issues:  Erin Jacob  reports that he has been smoking cigarettes. He has been smoking about 0.25 packs per day. He has never used smokeless tobacco.. I have educated him on the risk of " diseases from using tobacco products such as cancer, COPD and heart disease.     I advised him to quit and he is not willing to quit.    I spent 3  minutes counseling the patient.      Assessment & Plan   Problems Addressed this Visit    None     Visit Diagnoses     Schizoaffective disorder, bipolar type (HCC)        Relevant Medications    OLANZapine (ZyPREXA) 10 MG tablet    divalproex (Depakote ER) 500 MG 24 hr tablet    Other Relevant Orders    Valproic Acid Level, Total    Vitamin D 1,25 Dihydroxy    Vitamin B12    Comprehensive Metabolic Panel    Magnesium    CBC & Differential      Diagnoses       Codes Comments    Schizoaffective disorder, bipolar type (HCC)     ICD-10-CM: F25.0  ICD-9-CM: 295.70           -Continue to build therapeutic alliance and rapport  -Continue Zyprexa 10 mg p.o. daily for mood  -Continue Depakote ER and increase to 500 mg PO twice daily for mood   -Patient agreeable to obtain labs at next encounter.  Plan to assess CBC, CMP, magnesium, vitamin B12, vitamin D, Depakote level.  -Medications sent to pharmacy at this time.     Discussed medication and psychotherapy options.  Thoroughly discussed increased risk of weight gain, increased fasting glucose, increased serum A1c, increased serum lipids, risk for abnormal muscle movements, and or weight gain with the use of SGA's.  Reviewed the risks, benefits, and side effects of the medications; patient acknowledged and verbally consented.  Patient is agreeable to call the [facility].  Patient is aware to call 911 or go to the nearest ER should begin having SI/HI.      SUPPORTIVE PSYCHOTHERAPY: continuing efforts to promote the therapeutic alliance, address the patient’s issues, and strengthen self awareness, insights, and coping skills.  Assisted patient in processing above session content; acknowledged and normalized patient’s thoughts, feelings, and concerns.  Applied  positive coping skills and behavior management in session.Allowed  patient to freely discuss issues without interruption or judgment. Provided safe, confidential environment to facilitate the development of positive therapeutic relationship and encourage open, honest communication. Assisted patient in identifying risk factors which would indicate the need for higher level of care including thoughts to harm self or others and/or self-harming behavior and encouraged patient to contact this office, call 911, or present to the nearest emergency room should any of these events occur. Discussed crisis intervention services and means to access.  Patient adamantly and convincingly denies current suicidal or homicidal ideation or perceptual disturbance.    Return in about 2 weeks (around 5/25/2022), or if symptoms worsen or fail to improve, for Next scheduled follow up.        This document has been electronically signed by AREN Frederick  May 11, 2022 13:37 EDT   Errors in dictation may reflect use of voice recognition software and not all errors in transcription may have been detected prior to signing.      Total of 67 minutes spent face-to-face with patient discussing diagnosis, medications options, potential medication side effects, psychotherapy, ordering labs, ordering medications.

## 2022-05-26 ENCOUNTER — OFFICE VISIT (OUTPATIENT)
Dept: PSYCHIATRY | Facility: CLINIC | Age: 37
End: 2022-05-26

## 2022-05-26 ENCOUNTER — LAB (OUTPATIENT)
Dept: FAMILY MEDICINE CLINIC | Facility: CLINIC | Age: 37
End: 2022-05-26

## 2022-05-26 VITALS
TEMPERATURE: 98 F | OXYGEN SATURATION: 99 % | HEART RATE: 67 BPM | DIASTOLIC BLOOD PRESSURE: 79 MMHG | BODY MASS INDEX: 22.81 KG/M2 | WEIGHT: 168.4 LBS | HEIGHT: 72 IN | SYSTOLIC BLOOD PRESSURE: 129 MMHG

## 2022-05-26 DIAGNOSIS — F25.0 SCHIZOAFFECTIVE DISORDER, BIPOLAR TYPE: Primary | ICD-10-CM

## 2022-05-26 DIAGNOSIS — F25.0 SCHIZOAFFECTIVE DISORDER, BIPOLAR TYPE: ICD-10-CM

## 2022-05-26 DIAGNOSIS — F39 MOOD DISORDER: Primary | ICD-10-CM

## 2022-05-26 LAB
BASOPHILS # BLD AUTO: 0.05 10*3/MM3 (ref 0–0.2)
BASOPHILS NFR BLD AUTO: 0.8 % (ref 0–1.5)
DEPRECATED RDW RBC AUTO: 39.5 FL (ref 37–54)
EOSINOPHIL # BLD AUTO: 0.57 10*3/MM3 (ref 0–0.4)
EOSINOPHIL NFR BLD AUTO: 9.5 % (ref 0.3–6.2)
ERYTHROCYTE [DISTWIDTH] IN BLOOD BY AUTOMATED COUNT: 12.6 % (ref 12.3–15.4)
HCT VFR BLD AUTO: 42.9 % (ref 37.5–51)
HGB BLD-MCNC: 14.6 G/DL (ref 13–17.7)
IMM GRANULOCYTES # BLD AUTO: 0.01 10*3/MM3 (ref 0–0.05)
IMM GRANULOCYTES NFR BLD AUTO: 0.2 % (ref 0–0.5)
LYMPHOCYTES # BLD AUTO: 2.18 10*3/MM3 (ref 0.7–3.1)
LYMPHOCYTES NFR BLD AUTO: 36.4 % (ref 19.6–45.3)
MCH RBC QN AUTO: 29.4 PG (ref 26.6–33)
MCHC RBC AUTO-ENTMCNC: 34 G/DL (ref 31.5–35.7)
MCV RBC AUTO: 86.5 FL (ref 79–97)
MONOCYTES # BLD AUTO: 0.74 10*3/MM3 (ref 0.1–0.9)
MONOCYTES NFR BLD AUTO: 12.4 % (ref 5–12)
NEUTROPHILS NFR BLD AUTO: 2.44 10*3/MM3 (ref 1.7–7)
NEUTROPHILS NFR BLD AUTO: 40.7 % (ref 42.7–76)
NRBC BLD AUTO-RTO: 0 /100 WBC (ref 0–0.2)
PLATELET # BLD AUTO: 144 10*3/MM3 (ref 140–450)
PMV BLD AUTO: 10.7 FL (ref 6–12)
RBC # BLD AUTO: 4.96 10*6/MM3 (ref 4.14–5.8)
WBC NRBC COR # BLD: 5.99 10*3/MM3 (ref 3.4–10.8)

## 2022-05-26 PROCEDURE — 99214 OFFICE O/P EST MOD 30 MIN: CPT

## 2022-05-26 PROCEDURE — 84443 ASSAY THYROID STIM HORMONE: CPT

## 2022-05-26 PROCEDURE — 83735 ASSAY OF MAGNESIUM: CPT

## 2022-05-26 PROCEDURE — 85025 COMPLETE CBC W/AUTO DIFF WBC: CPT

## 2022-05-26 PROCEDURE — 36415 COLL VENOUS BLD VENIPUNCTURE: CPT

## 2022-05-26 PROCEDURE — 82607 VITAMIN B-12: CPT

## 2022-05-26 PROCEDURE — 82652 VIT D 1 25-DIHYDROXY: CPT

## 2022-05-26 PROCEDURE — 80164 ASSAY DIPROPYLACETIC ACD TOT: CPT

## 2022-05-26 PROCEDURE — 80053 COMPREHEN METABOLIC PANEL: CPT

## 2022-05-26 RX ORDER — OLANZAPINE 10 MG/1
10 TABLET ORAL NIGHTLY
Qty: 30 TABLET | Refills: 0 | Status: SHIPPED | OUTPATIENT
Start: 2022-05-26 | End: 2022-05-26 | Stop reason: SDUPTHER

## 2022-05-26 RX ORDER — OLANZAPINE 10 MG/1
10 TABLET ORAL NIGHTLY
Qty: 30 TABLET | Refills: 0 | Status: SHIPPED | OUTPATIENT
Start: 2022-05-26 | End: 2022-06-28

## 2022-05-26 RX ORDER — DIVALPROEX SODIUM 500 MG/1
1000 TABLET, EXTENDED RELEASE ORAL DAILY
Qty: 60 TABLET | Refills: 0 | Status: SHIPPED | OUTPATIENT
Start: 2022-05-26 | End: 2022-06-28 | Stop reason: SDUPTHER

## 2022-05-26 RX ORDER — DIVALPROEX SODIUM 500 MG/1
1000 TABLET, EXTENDED RELEASE ORAL DAILY
Qty: 60 TABLET | Refills: 0 | Status: SHIPPED | OUTPATIENT
Start: 2022-05-26 | End: 2022-05-26 | Stop reason: SDUPTHER

## 2022-05-26 NOTE — PROGRESS NOTES
"  Cheyanne Jacob is a 36 y.o. male is here today for medication management follow-up.    Chief Complaint:  Schizoaffective: Bipolar type    History of Present Illness:     Patient denies negative side effects with current regimen. He identifies that he feels \"that yesterday was the best day that davey had in 7-8 months.\"  He reports that parents have been still in charge of medication to ensure compliance.  He identifies that visual and auditory hallucinations have decreased, remain present but reports that they are not as distracting..  He identifies that paranoia remains present but he is able to redirect his thoughts.  He identifies that energy levels have improved but he is reporting daytime fatigue.  He identifies that sleep is also improved approximating 8 to 10 hours/day without intermittent awakenings or the occurrence of nightmares.  Irritability remains present but significantly decreased with Depakote dosing.  He reports that appetite has been good and he is approximating 3 meals per day.  5 pound documented weight gain since last encounter. Body mass index is 22.83 kg/m².  Denies any overt feelings of depression or excessive worry.  Does report paranoia but denies feelings of grandiosity.  AVH remains present as described above.  He denies any command hallucinations.  Denies any self harm.  He adamantly denies SI and HI.    The following portions of the patient's history were reviewed and updated as appropriate: allergies, current medications, past family history, past medical history, past social history, past surgical history and problem list.    Review of Systems   Constitutional: Positive for activity change and appetite change. Negative for fatigue and unexpected weight change.   HENT: Negative for drooling and tinnitus.    Eyes: Negative for photophobia, redness and visual disturbance.   Respiratory: Negative for chest tightness and shortness of breath.    Cardiovascular: Negative for " chest pain and palpitations.   Gastrointestinal: Negative for abdominal pain, diarrhea and vomiting.   Endocrine: Negative for polydipsia and polyuria.   Genitourinary: Negative for frequency and urgency.   Musculoskeletal: Negative for arthralgias and gait problem.   Allergic/Immunologic: Negative for environmental allergies and immunocompromised state.   Neurological: Negative for dizziness, tremors, seizures, syncope, facial asymmetry, speech difficulty, weakness, light-headedness, numbness and headaches.   Hematological: Negative for adenopathy. Does not bruise/bleed easily.   Psychiatric/Behavioral: Positive for decreased concentration and hallucinations. Negative for agitation, behavioral problems, confusion, dysphoric mood, self-injury, sleep disturbance and suicidal ideas. The patient is not nervous/anxious and is not hyperactive.        Objective   Physical Exam  Vitals reviewed.   Constitutional:       Appearance: Normal appearance. He is normal weight.   HENT:      Head: Normocephalic.      Nose: Nose normal.      Mouth/Throat:      Mouth: Mucous membranes are moist.      Pharynx: Oropharynx is clear.   Eyes:      Extraocular Movements: Extraocular movements intact.      Conjunctiva/sclera: Conjunctivae normal.      Pupils: Pupils are equal, round, and reactive to light.   Cardiovascular:      Rate and Rhythm: Normal rate.   Pulmonary:      Effort: Pulmonary effort is normal.   Musculoskeletal:         General: Normal range of motion.      Cervical back: Normal range of motion.   Skin:     General: Skin is warm and dry.   Neurological:      General: No focal deficit present.      Mental Status: He is alert and oriented to person, place, and time. Mental status is at baseline.   Psychiatric:         Attention and Perception: Attention normal. He perceives auditory hallucinations.         Mood and Affect: Mood and affect normal. Affect is not tearful.         Speech: Speech normal. Speech is not tangential.  "        Behavior: Behavior is cooperative.         Thought Content: Thought content is paranoid. Thought content does not include homicidal or suicidal ideation. Thought content does not include homicidal or suicidal plan.         Cognition and Memory: Cognition and memory normal.         Judgment: Judgment is impulsive.       Blood pressure 129/79, pulse 67, temperature 98 °F (36.7 °C), height 182.9 cm (72.01\"), weight 76.4 kg (168 lb 6.4 oz), SpO2 99 %.    Medication List:   Current Outpatient Medications   Medication Sig Dispense Refill   • divalproex (Depakote ER) 500 MG 24 hr tablet Take 2 tablets by mouth Daily for 30 days. 60 tablet 0   • OLANZapine (ZyPREXA) 10 MG tablet Take 1 tablet by mouth Every Night. 30 tablet 0     No current facility-administered medications for this visit.       Mental Status Exam:   Hygiene:   fair  Cooperation:  Cooperative  Eye Contact:  Fair  Psychomotor Behavior:  Appropriate  Affect:  Full range and Appropriate  Hopelessness: Optimistic  Speech:  Normal  Thought Process:  Goal directed and Circum  Thought Content:  Normal and Mood congruent  Suicidal:  None  Homicidal:  None  Hallucinations:  Not demonstrated today  Delusion:  Paranoid  Memory:  Intact  Orientation:  Person, Place, Time and Situation  Reliability:  fair  Insight:  Fair  Judgement:  Fair  Impulse Control:  Fair  Physical/Medical Issues:  No       Kenney Jacob  reports that he has been smoking cigarettes. He has been smoking about 0.25 packs per day. He has never used smokeless tobacco.. I have educated him on the risk of diseases from using tobacco products such as cancer, COPD and heart disease.     I advised him to quit and he is not willing to quit.    I spent 3  minutes counseling the patient.      Assessment & Plan   Problems Addressed this Visit    None     Visit Diagnoses     Schizoaffective disorder, bipolar type (HCC)    -  Primary    Relevant Medications    divalproex (Depakote ER) 500 MG 24 hr " tablet    OLANZapine (ZyPREXA) 10 MG tablet    Other Relevant Orders    TSH Rfx On Abnormal To Free T4      Diagnoses       Codes Comments    Schizoaffective disorder, bipolar type (HCC)    -  Primary ICD-10-CM: F25.0  ICD-9-CM: 295.70           -Continue to build therapeutic alliance and rapport  -Continue Zyprexa 10 mg p.o. daily for mood  -Continue Depakote ER and increase to 500 mg PO twice daily for mood   -CBC, CMP, magnesium, vitamin B12, vitamin D, Depakote level, and TSH with reflex obtained today and will review results once available.   -Medications sent to pharmacy at this time.     Discussed medication and psychotherapy options.  Thoroughly discussed increased risk of weight gain, increased fasting glucose, increased serum A1c, increased serum lipids, risk for abnormal muscle movements, and or weight gain with the use of SGA's.  Reviewed the risks, benefits, and side effects of the medications; patient acknowledged and verbally consented.  Patient is agreeable to call the [facility].  Patient is aware to call 911 or go to the nearest ER should begin having SI/HI.      SUPPORTIVE PSYCHOTHERAPY: continuing efforts to promote the therapeutic alliance, address the patient’s issues, and strengthen self awareness, insights, and coping skills.  Assisted patient in processing above session content; acknowledged and normalized patient’s thoughts, feelings, and concerns.  Applied  positive coping skills and behavior management in session.Allowed patient to freely discuss issues without interruption or judgment. Provided safe, confidential environment to facilitate the development of positive therapeutic relationship and encourage open, honest communication. Assisted patient in identifying risk factors which would indicate the need for higher level of care including thoughts to harm self or others and/or self-harming behavior and encouraged patient to contact this office, call 911, or present to the nearest  emergency room should any of these events occur. Discussed crisis intervention services and means to access.  Patient adamantly and convincingly denies current suicidal or homicidal ideation or perceptual disturbance.    Return in about 4 weeks (around 6/23/2022), or if symptoms worsen or fail to improve, for Next scheduled follow up.        This document has been electronically signed by AREN Frederick  May 26, 2022 10:09 EDT   Errors in dictation may reflect use of voice recognition software and not all errors in transcription may have been detected prior to signing.      Total of 37 minutes spent face-to-face with patient discussing diagnosis, medications options, potential medication side effects, psychotherapy described above, obtaining labs, ordering medications.

## 2022-05-27 LAB
ALBUMIN SERPL-MCNC: 4.1 G/DL (ref 3.5–5.2)
ALBUMIN/GLOB SERPL: 2.6 G/DL
ALP SERPL-CCNC: 68 U/L (ref 39–117)
ALT SERPL W P-5'-P-CCNC: 43 U/L (ref 1–41)
ANION GAP SERPL CALCULATED.3IONS-SCNC: 13 MMOL/L (ref 5–15)
AST SERPL-CCNC: 29 U/L (ref 1–40)
BILIRUB SERPL-MCNC: 0.9 MG/DL (ref 0–1.2)
BUN SERPL-MCNC: 13 MG/DL (ref 6–20)
BUN/CREAT SERPL: 11.7 (ref 7–25)
CALCIUM SPEC-SCNC: 8.8 MG/DL (ref 8.6–10.5)
CHLORIDE SERPL-SCNC: 105 MMOL/L (ref 98–107)
CO2 SERPL-SCNC: 23 MMOL/L (ref 22–29)
CREAT SERPL-MCNC: 1.11 MG/DL (ref 0.76–1.27)
EGFRCR SERPLBLD CKD-EPI 2021: 87.2 ML/MIN/1.73
GLOBULIN UR ELPH-MCNC: 1.6 GM/DL
GLUCOSE SERPL-MCNC: 113 MG/DL (ref 65–99)
MAGNESIUM SERPL-MCNC: 2 MG/DL (ref 1.6–2.6)
POTASSIUM SERPL-SCNC: 3.8 MMOL/L (ref 3.5–5.2)
PROT SERPL-MCNC: 5.7 G/DL (ref 6–8.5)
SODIUM SERPL-SCNC: 141 MMOL/L (ref 136–145)
TSH SERPL DL<=0.05 MIU/L-ACNC: 1.71 UIU/ML (ref 0.27–4.2)
VALPROATE SERPL-MCNC: 77.3 MCG/ML (ref 50–125)
VIT B12 BLD-MCNC: 1119 PG/ML (ref 211–946)

## 2022-05-30 LAB — 1,25(OH)2D SERPL-MCNC: 46 PG/ML (ref 19.9–79.3)

## 2022-06-28 ENCOUNTER — OFFICE VISIT (OUTPATIENT)
Dept: PSYCHIATRY | Facility: CLINIC | Age: 37
End: 2022-06-28

## 2022-06-28 VITALS
TEMPERATURE: 96.9 F | BODY MASS INDEX: 22.4 KG/M2 | HEART RATE: 66 BPM | DIASTOLIC BLOOD PRESSURE: 82 MMHG | SYSTOLIC BLOOD PRESSURE: 135 MMHG | HEIGHT: 72 IN | WEIGHT: 165.4 LBS | OXYGEN SATURATION: 99 %

## 2022-06-28 DIAGNOSIS — F25.0 SCHIZOAFFECTIVE DISORDER, BIPOLAR TYPE: ICD-10-CM

## 2022-06-28 DIAGNOSIS — F39 MOOD DISORDER: ICD-10-CM

## 2022-06-28 PROCEDURE — 99214 OFFICE O/P EST MOD 30 MIN: CPT

## 2022-06-28 RX ORDER — OLANZAPINE 2.5 MG/1
2.5 TABLET ORAL NIGHTLY
Qty: 30 TABLET | Refills: 0 | Status: SHIPPED | OUTPATIENT
Start: 2022-06-28 | End: 2022-07-28 | Stop reason: SDUPTHER

## 2022-06-28 RX ORDER — DIVALPROEX SODIUM 500 MG/1
1000 TABLET, EXTENDED RELEASE ORAL DAILY
Qty: 180 TABLET | Refills: 0 | Status: SHIPPED | OUTPATIENT
Start: 2022-06-28 | End: 2022-08-25 | Stop reason: SDUPTHER

## 2022-06-28 NOTE — PROGRESS NOTES
"  Subjective   Kenney Jacob is a 36 y.o. male is here today for medication management follow-up. Mother, present during encounter with patient permission.     Chief Complaint:  Schizoaffective: Bipolar type    History of Present Illness:     Patient denies negative side effects with current regimen; however, reports that he noted that he \"sleeping too much.\"  He reports that he feels that it was \"a good dose in the acute manic phase and made all of my auditory hallucinations disappear.\"  Mother identifies that she has noticed a significant improvement before medication and to now.  She reports that she is still in control of medication administration for compliance and feels that this has been a very positive motion for patient.  He identifies that when someone else is in charge of medicine he feels that he does not forget to take his medicine and therefore he is more compliant.  He identifies that overall mood has remained unchanged with discontinuing Zyprexa but he has noted that hallucinations and paranoia have became more prominent both patient and mom report that redirectability is there.  Auditory hallucinations as well as visual hallucinations have increased but insight remains present.  Paranoia remains present but is redirectable.  Daytime fatigue has decreased.  Sleep has became more restless but is continuing to approximate 6 to 7 hours per night.  Reports 2 to 3-hour time frame before sleep initiation.  Nightmares are present but have decreased in frequency.  Anger and irritability remain well controlled with current Depakote dosing.  Appetite basically unchanged approximating 2-3 meals per day.  3 pound weight loss since last encounter.  Body mass index is 22.43 kg/m².Denies any overt feelings of depression or excess worry.  Denies any feelings of grandiosity.  Denies any command hallucinations.  Denies self-harm.  Adamantly denies SI and HI.    The following portions of the patient's history were " reviewed and updated as appropriate: allergies, current medications, past family history, past medical history, past social history, past surgical history and problem list.    Review of Systems   Constitutional: Negative for activity change, appetite change, fatigue and unexpected weight change.   HENT: Negative for drooling and tinnitus.    Eyes: Negative for photophobia, redness and visual disturbance.   Respiratory: Negative for chest tightness and shortness of breath.    Cardiovascular: Negative for chest pain and palpitations.   Gastrointestinal: Negative for abdominal pain, diarrhea and vomiting.   Endocrine: Negative for polydipsia and polyuria.   Genitourinary: Negative for frequency and urgency.   Musculoskeletal: Negative for arthralgias and gait problem.   Allergic/Immunologic: Negative for environmental allergies and immunocompromised state.   Neurological: Negative for dizziness, tremors, seizures, syncope, facial asymmetry, speech difficulty, weakness, light-headedness, numbness and headaches.   Hematological: Negative for adenopathy. Does not bruise/bleed easily.   Psychiatric/Behavioral: Positive for hallucinations and sleep disturbance. Negative for agitation, behavioral problems, confusion, decreased concentration, dysphoric mood, self-injury and suicidal ideas. The patient is not nervous/anxious and is not hyperactive.        Objective   Physical Exam  Vitals reviewed.   Constitutional:       Appearance: Normal appearance. He is normal weight.   HENT:      Head: Normocephalic.      Nose: Nose normal.      Mouth/Throat:      Mouth: Mucous membranes are moist.      Pharynx: Oropharynx is clear.   Eyes:      Extraocular Movements: Extraocular movements intact.      Conjunctiva/sclera: Conjunctivae normal.      Pupils: Pupils are equal, round, and reactive to light.   Cardiovascular:      Rate and Rhythm: Normal rate.   Pulmonary:      Effort: Pulmonary effort is normal.   Musculoskeletal:          "General: Normal range of motion.      Cervical back: Normal range of motion.   Skin:     General: Skin is warm and dry.   Neurological:      General: No focal deficit present.      Mental Status: He is alert and oriented to person, place, and time. Mental status is at baseline.   Psychiatric:         Attention and Perception: Attention normal. He perceives auditory and visual hallucinations.         Mood and Affect: Mood and affect normal. Affect is not tearful.         Speech: Speech normal. Speech is not tangential.         Behavior: Behavior is cooperative.         Thought Content: Thought content is paranoid. Thought content does not include homicidal or suicidal ideation. Thought content does not include homicidal or suicidal plan.         Cognition and Memory: Cognition and memory normal.         Judgment: Judgment is impulsive.       Blood pressure 135/82, pulse 66, temperature 96.9 °F (36.1 °C), temperature source Temporal, height 182.9 cm (72.01\"), weight 75 kg (165 lb 6.4 oz), SpO2 99 %.    Medication List:   Current Outpatient Medications   Medication Sig Dispense Refill   • divalproex (Depakote ER) 500 MG 24 hr tablet Take 2 tablets by mouth Daily for 90 days. 180 tablet 0   • OLANZapine (ZyPREXA) 2.5 MG tablet Take 1 tablet by mouth Every Night. 30 tablet 0     No current facility-administered medications for this visit.       Mental Status Exam:   Hygiene:   fair  Cooperation:  Cooperative  Eye Contact:  Fair  Psychomotor Behavior:  Appropriate  Affect:  Full range and Appropriate  Hopelessness: Optimistic  Speech:  Normal  Thought Process:  Goal directed and Circum  Thought Content:  Normal and Mood congruent  Suicidal:  None  Homicidal:  None  Hallucinations:  Visual  Delusion:  Paranoid  Memory:  Intact  Orientation:  Person, Place, Time and Situation  Reliability:  fair  Insight:  Fair  Judgement:  Fair  Impulse Control:  Fair  Physical/Medical Issues:  No       Kenney Jacob  reports that he has " been smoking cigarettes. He has been smoking about 0.25 packs per day. He has never used smokeless tobacco.. I have educated him on the risk of diseases from using tobacco products such as cancer, COPD and heart disease.     I advised him to quit and he is not willing to quit.    I spent 3  minutes counseling the patient.      Assessment & Plan   Problems Addressed this Visit    None     Visit Diagnoses     Schizoaffective disorder, bipolar type (HCC)        Relevant Medications    divalproex (Depakote ER) 500 MG 24 hr tablet    OLANZapine (ZyPREXA) 2.5 MG tablet    Mood disorder (HCC)        Relevant Medications    divalproex (Depakote ER) 500 MG 24 hr tablet    OLANZapine (ZyPREXA) 2.5 MG tablet      Diagnoses       Codes Comments    Schizoaffective disorder, bipolar type (HCC)     ICD-10-CM: F25.0  ICD-9-CM: 295.70     Mood disorder (HCC)     ICD-10-CM: F39  ICD-9-CM: 296.90           -Continue to build therapeutic alliance and rapport  -Decrease Zyprexa to 2.5 mg p.o. daily for mood to aid in compliance per patient request.  -Continue Depakote ER and increase to 500 mg PO twice daily for mood   -Labs reviewed: Stable.  Depakote level therapeutic and efficacy is identified.  -Medications sent to pharmacy at this time.     Discussed medication and psychotherapy options.  Thoroughly discussed increased risk of weight gain, increased fasting glucose, increased serum A1c, increased serum lipids, risk for abnormal muscle movements, and or weight gain with the use of SGA's.  Reviewed the risks, benefits, and side effects of the medications; patient acknowledged and verbally consented.  Patient is agreeable to call the [facility].  Patient is aware to call 911 or go to the nearest ER should begin having SI/HI.      SUPPORTIVE PSYCHOTHERAPY: continuing efforts to promote the therapeutic alliance, address the patient’s issues, and strengthen self awareness, insights, and coping skills.  Assisted patient in processing  above session content; acknowledged and normalized patient’s thoughts, feelings, and concerns.  Applied  positive coping skills and behavior management in session.Allowed patient to freely discuss issues without interruption or judgment. Provided safe, confidential environment to facilitate the development of positive therapeutic relationship and encourage open, honest communication. Assisted patient in identifying risk factors which would indicate the need for higher level of care including thoughts to harm self or others and/or self-harming behavior and encouraged patient to contact this office, call 911, or present to the nearest emergency room should any of these events occur. Discussed crisis intervention services and means to access.  Patient adamantly and convincingly denies current suicidal or homicidal ideation or perceptual disturbance.    Return in about 4 weeks (around 7/26/2022), or if symptoms worsen or fail to improve, for Next scheduled follow up.        This document has been electronically signed by AREN Frederick  June 28, 2022 12:14 EDT   Errors in dictation may reflect use of voice recognition software and not all errors in transcription may have been detected prior to signing.

## 2022-07-28 ENCOUNTER — OFFICE VISIT (OUTPATIENT)
Dept: PSYCHIATRY | Facility: CLINIC | Age: 37
End: 2022-07-28

## 2022-07-28 ENCOUNTER — TELEPHONE (OUTPATIENT)
Dept: FAMILY MEDICINE CLINIC | Facility: CLINIC | Age: 37
End: 2022-07-28

## 2022-07-28 VITALS
SYSTOLIC BLOOD PRESSURE: 135 MMHG | HEART RATE: 69 BPM | OXYGEN SATURATION: 99 % | HEIGHT: 72 IN | DIASTOLIC BLOOD PRESSURE: 81 MMHG | BODY MASS INDEX: 22.65 KG/M2 | TEMPERATURE: 97.1 F | WEIGHT: 167.2 LBS

## 2022-07-28 DIAGNOSIS — F25.0 SCHIZOAFFECTIVE DISORDER, BIPOLAR TYPE: Primary | ICD-10-CM

## 2022-07-28 DIAGNOSIS — F39 MOOD DISORDER: ICD-10-CM

## 2022-07-28 DIAGNOSIS — F25.0 SCHIZOAFFECTIVE DISORDER, BIPOLAR TYPE: ICD-10-CM

## 2022-07-28 PROCEDURE — 99214 OFFICE O/P EST MOD 30 MIN: CPT

## 2022-07-28 RX ORDER — OLANZAPINE 2.5 MG/1
5 TABLET ORAL NIGHTLY
Qty: 60 TABLET | Refills: 0 | Status: SHIPPED | OUTPATIENT
Start: 2022-07-28 | End: 2022-08-25 | Stop reason: SDUPTHER

## 2022-07-28 RX ORDER — OLANZAPINE 2.5 MG/1
2.5 TABLET ORAL NIGHTLY
Qty: 30 TABLET | Refills: 0 | Status: SHIPPED | OUTPATIENT
Start: 2022-07-28 | End: 2022-07-28 | Stop reason: SDUPTHER

## 2022-07-28 NOTE — PROGRESS NOTES
"  Subjective   Kenney Jacob is a 36 y.o. male is here today for medication management follow-up.   Chief Complaint:  Schizoaffective: Bipolar type    History of Present Illness:     Patient denies negative side effects with current regimen.  Patient reports that mood has been stable.  He reports that he does feel that his mind is more clear in the afternoon when he takes Zyprexa dosing and Depakote together.  Continues with employment.  Anxiety and depression are not problematic at this time.  He reports an improvement in self-care.  Sleep has improved approximating 7 to 8 hours per night with decreased intermittent awakenings.  Vivid dreams but denies nightmares.  Identifies that he has had days in which he has forgotten to take his medication and he identifies that he could tell a significant difference and noting that he felt worse.  Hallucinations and paranoia remain however insight is still intact.  He identifies that he is able to redirect his thoughts.  Appetite has remained unchanged approximating 3 meals per day.  2 pound documented weight gain since last encounter. Body mass index is 22.67 kg/m².  Denies any physical outburst of anger/violence/destruction of property.  Identifies that he is feeling more \"relaxed.\"  Identifies that he is still relying heavily on his mother to ensure medication compliance.  He reports that he does have times in which he does feel paranoia with medication and allowing his mother to manage this has allowed for consistency.  Denies any command hallucinations. denies self-harm.  Denies SI and HI.    The following portions of the patient's history were reviewed and updated as appropriate: allergies, current medications, past family history, past medical history, past social history, past surgical history and problem list.    Review of Systems   Constitutional: Negative for activity change, appetite change, fatigue and unexpected weight change.   HENT: Negative for drooling and " tinnitus.    Eyes: Negative for photophobia, redness and visual disturbance.   Respiratory: Negative for chest tightness and shortness of breath.    Cardiovascular: Negative for chest pain and palpitations.   Gastrointestinal: Negative for abdominal pain, diarrhea and vomiting.   Endocrine: Negative for polydipsia and polyuria.   Genitourinary: Negative for frequency and urgency.   Musculoskeletal: Negative for arthralgias and gait problem.   Allergic/Immunologic: Negative for environmental allergies and immunocompromised state.   Neurological: Negative for dizziness, tremors, seizures, syncope, facial asymmetry, speech difficulty, weakness, light-headedness, numbness and headaches.   Hematological: Negative for adenopathy. Does not bruise/bleed easily.   Psychiatric/Behavioral: Positive for hallucinations. Negative for agitation, behavioral problems, confusion, decreased concentration, dysphoric mood, self-injury, sleep disturbance and suicidal ideas. The patient is not nervous/anxious and is not hyperactive.        Objective   Physical Exam  Vitals reviewed.   Constitutional:       Appearance: Normal appearance. He is normal weight.   HENT:      Head: Normocephalic.      Nose: Nose normal.      Mouth/Throat:      Mouth: Mucous membranes are moist.      Pharynx: Oropharynx is clear.   Eyes:      Extraocular Movements: Extraocular movements intact.      Conjunctiva/sclera: Conjunctivae normal.      Pupils: Pupils are equal, round, and reactive to light.   Cardiovascular:      Rate and Rhythm: Normal rate.   Pulmonary:      Effort: Pulmonary effort is normal.   Musculoskeletal:         General: Normal range of motion.      Cervical back: Normal range of motion.   Skin:     General: Skin is warm and dry.   Neurological:      General: No focal deficit present.      Mental Status: He is alert and oriented to person, place, and time. Mental status is at baseline.   Psychiatric:         Attention and Perception: Attention  "normal. He perceives auditory and visual hallucinations.         Mood and Affect: Mood and affect normal. Affect is not tearful.         Speech: Speech normal. Speech is not tangential.         Behavior: Behavior is cooperative.         Thought Content: Thought content normal. Thought content does not include homicidal or suicidal ideation. Thought content does not include homicidal or suicidal plan.         Cognition and Memory: Cognition and memory normal.         Judgment: Judgment is impulsive.       Blood pressure 135/81, pulse 69, temperature 97.1 °F (36.2 °C), temperature source Temporal, height 182.9 cm (72.01\"), weight 75.8 kg (167 lb 3.2 oz), SpO2 99 %.    Medication List:   Current Outpatient Medications   Medication Sig Dispense Refill   • divalproex (Depakote ER) 500 MG 24 hr tablet Take 2 tablets by mouth Daily for 90 days. 180 tablet 0   • OLANZapine (ZyPREXA) 2.5 MG tablet Take 2 tablets by mouth Every Night for 30 days. Take 2.5 mg in the AM and 2.5 mg in the afternoon. 60 tablet 0     No current facility-administered medications for this visit.       Mental Status Exam:   Hygiene:   good  Cooperation:  Cooperative  Eye Contact:  Good  Psychomotor Behavior:  Appropriate  Affect:  Full range and Appropriate  Hopelessness: Optimistic  Speech:  Normal  Thought Process:  Goal directed and Circum  Thought Content:  Normal and Mood congruent  Suicidal:  None  Homicidal:  None  Hallucinations:  Visual  Delusion:  Paranoid  Memory:  Intact  Orientation:  Person, Place, Time and Situation  Reliability:  fair  Insight:  Fair  Judgement:  Fair  Impulse Control:  Fair  Physical/Medical Issues:  No       Kenney Jacob  reports that he has been smoking cigarettes. He has been smoking about 0.25 packs per day. He has never used smokeless tobacco.. I have educated him on the risk of diseases from using tobacco products such as cancer, COPD and heart disease.     I advised him to quit and he is not willing to " quit.    I spent 3  minutes counseling the patient.      Assessment & Plan   Problems Addressed this Visit    None     Visit Diagnoses     Schizoaffective disorder, bipolar type (HCC)        Relevant Medications    OLANZapine (ZyPREXA) 2.5 MG tablet    Mood disorder (HCC)        Relevant Medications    OLANZapine (ZyPREXA) 2.5 MG tablet      Diagnoses       Codes Comments    Schizoaffective disorder, bipolar type (HCC)     ICD-10-CM: F25.0  ICD-9-CM: 295.70     Mood disorder (HCC)     ICD-10-CM: F39  ICD-9-CM: 296.90           -Continue to build therapeutic alliance and rapport  -Increase Zyprexa to 2.5 mg p.o. twice daily for mood.  -Continue Depakote ER and increase to 500 mg PO twice daily for mood   -Medications sent to pharmacy at this time.     Discussed medication and psychotherapy options.  Patient is tolerating medications at this time without negative effects.  Overall patient has had great improvement in mood and stability.  Continues to identify compliance with appointments and medications.  Thoroughly discussed increased risk of weight gain, increased fasting glucose, increased serum A1c, increased serum lipids, risk for abnormal muscle movements, and or weight gain with the use of SGA's.  Reviewed the risks, benefits, and side effects of the medications; patient acknowledged and verbally consented.  Patient is agreeable to call the [facility].  Patient is aware to call 911 or go to the nearest ER should begin having SI/HI.      SUPPORTIVE PSYCHOTHERAPY: continuing efforts to promote the therapeutic alliance, address the patient’s issues, and strengthen self awareness, insights, and coping skills.  Assisted patient in processing above session content; acknowledged and normalized patient’s thoughts, feelings, and concerns.  Applied  positive coping skills and behavior management in session.Allowed patient to freely discuss issues without interruption or judgment. Provided safe, confidential  environment to facilitate the development of positive therapeutic relationship and encourage open, honest communication. Assisted patient in identifying risk factors which would indicate the need for higher level of care including thoughts to harm self or others and/or self-harming behavior and encouraged patient to contact this office, call 911, or present to the nearest emergency room should any of these events occur. Discussed crisis intervention services and means to access.  Patient adamantly and convincingly denies current suicidal or homicidal ideation or perceptual disturbance.    Return in about 4 weeks (around 8/25/2022), or if symptoms worsen or fail to improve, for Next scheduled follow up.        This document has been electronically signed by AREN Frederick  July 28, 2022 12:51 EDT   Errors in dictation may reflect use of voice recognition software and not all errors in transcription may have been detected prior to signing.

## 2022-07-28 NOTE — TELEPHONE ENCOUNTER
I contacted the patient's pharmacy to cancel the Rx for Zyprexa 2.5 mg once a day and to notify them that the Rx for Zyprexa 2.5 mg two times a day is what should be filled, per provider's request. The pharmacist verbalized understanding.

## 2022-08-25 ENCOUNTER — TELEPHONE (OUTPATIENT)
Dept: FAMILY MEDICINE CLINIC | Facility: CLINIC | Age: 37
End: 2022-08-25

## 2022-08-25 ENCOUNTER — OFFICE VISIT (OUTPATIENT)
Dept: PSYCHIATRY | Facility: CLINIC | Age: 37
End: 2022-08-25

## 2022-08-25 VITALS
HEIGHT: 72 IN | HEART RATE: 88 BPM | BODY MASS INDEX: 23.27 KG/M2 | DIASTOLIC BLOOD PRESSURE: 79 MMHG | WEIGHT: 171.8 LBS | SYSTOLIC BLOOD PRESSURE: 133 MMHG | TEMPERATURE: 97.7 F | OXYGEN SATURATION: 97 %

## 2022-08-25 DIAGNOSIS — F25.0 SCHIZOAFFECTIVE DISORDER, BIPOLAR TYPE: ICD-10-CM

## 2022-08-25 DIAGNOSIS — F39 MOOD DISORDER: ICD-10-CM

## 2022-08-25 DIAGNOSIS — F51.5 NIGHTMARES: Primary | ICD-10-CM

## 2022-08-25 PROCEDURE — 99214 OFFICE O/P EST MOD 30 MIN: CPT

## 2022-08-25 RX ORDER — OLANZAPINE 2.5 MG/1
7.5 TABLET ORAL NIGHTLY
Qty: 90 TABLET | Refills: 1 | Status: SHIPPED | OUTPATIENT
Start: 2022-08-25 | End: 2022-08-25 | Stop reason: SDUPTHER

## 2022-08-25 RX ORDER — TOPIRAMATE 25 MG/1
25 TABLET ORAL NIGHTLY
Qty: 30 TABLET | Refills: 1 | Status: SHIPPED | OUTPATIENT
Start: 2022-08-25 | End: 2022-10-25

## 2022-08-25 RX ORDER — DIVALPROEX SODIUM 500 MG/1
1000 TABLET, EXTENDED RELEASE ORAL DAILY
Qty: 180 TABLET | Refills: 0 | Status: SHIPPED | OUTPATIENT
Start: 2022-08-25 | End: 2022-10-25 | Stop reason: SDUPTHER

## 2022-08-25 RX ORDER — OLANZAPINE 5 MG/1
TABLET ORAL
Qty: 120 TABLET | Refills: 0 | Status: SHIPPED | OUTPATIENT
Start: 2022-08-25 | End: 2022-10-25 | Stop reason: SDUPTHER

## 2022-08-25 NOTE — TELEPHONE ENCOUNTER
Insurance will only cover meds QD. Pharmacy suggests 2mg and 5mg prescription or 7.5mg  Please advise     Pharmacy advised

## 2022-08-25 NOTE — PROGRESS NOTES
"  Subjective   Kenney Jacob is a 36 y.o. male is here today for medication management follow-up.     Chief Complaint:  Schizoaffective: Bipolar type    History of Present Illness:     Patient denies negative side effects with current regimen.  He reports that he feels that he has been taking \"a few steps backwards.\"  He identifies that he has had an influx of situational stress as he has been worrying about his father and identifies stress associated with his current job.  He reports that he feels he is having a harder time differentiating hallucinations.  He reports that he has been seeing more \"textures.\"  He describes this as he will often see worms or other small things places that he knows that they should not be.  He reports that paranoia and suspicions have gotten more prominent and he feels that people are \"lying to me.\"  He reports that depression and overall anxiety have been stable.  He does report an increase in restlessness.  He reports that he has had \"a few nights of hard time sleeping.\"  He reports that he has had an increase in nightmares which he feels is interfering with his sleep.  He is approximating 4 to 6 hours per night.  He does endorse medication compliance and reports that his mother is giving him his medicine.  However he does identify that he has been cutting the Zyprexa in half which would be 1.25 mg twice daily versus the instructed 2.5.  Appetite remains well approximating 2-3 meals per day.  4 pound documented weight gain. Body mass index is 23.3 kg/m².  Does report that he has found himself often feeling more agitated but identifies that he has been able to \"keep it more inside versus taking it out on others.\"Denies any command hallucinations. denies self-harm.  Denies SI and HI.    The following portions of the patient's history were reviewed and updated as appropriate: allergies, current medications, past family history, past medical history, past social history, past surgical " history and problem list.    Review of Systems   Constitutional: Negative for activity change, appetite change, fatigue and unexpected weight change.   HENT: Negative for drooling and tinnitus.    Eyes: Negative for photophobia, redness and visual disturbance.   Respiratory: Negative for chest tightness and shortness of breath.    Cardiovascular: Negative for chest pain and palpitations.   Gastrointestinal: Negative for abdominal pain, diarrhea and vomiting.   Endocrine: Negative for polydipsia and polyuria.   Genitourinary: Negative for frequency and urgency.   Musculoskeletal: Negative for arthralgias and gait problem.   Allergic/Immunologic: Negative for environmental allergies and immunocompromised state.   Neurological: Negative for dizziness, tremors, seizures, syncope, facial asymmetry, speech difficulty, weakness, light-headedness, numbness and headaches.   Hematological: Negative for adenopathy. Does not bruise/bleed easily.   Psychiatric/Behavioral: Positive for hallucinations and sleep disturbance. Negative for agitation, behavioral problems, confusion, decreased concentration, dysphoric mood, self-injury and suicidal ideas. The patient is not nervous/anxious and is not hyperactive.        Objective   Physical Exam  Vitals reviewed.   Constitutional:       Appearance: Normal appearance. He is normal weight.   HENT:      Head: Normocephalic.      Nose: Nose normal.      Mouth/Throat:      Mouth: Mucous membranes are moist.      Pharynx: Oropharynx is clear.   Eyes:      Extraocular Movements: Extraocular movements intact.      Conjunctiva/sclera: Conjunctivae normal.      Pupils: Pupils are equal, round, and reactive to light.   Cardiovascular:      Rate and Rhythm: Normal rate.   Pulmonary:      Effort: Pulmonary effort is normal.   Musculoskeletal:         General: Normal range of motion.      Cervical back: Normal range of motion.   Skin:     General: Skin is warm and dry.   Neurological:      General:  "No focal deficit present.      Mental Status: He is alert and oriented to person, place, and time. Mental status is at baseline.   Psychiatric:         Attention and Perception: Attention normal. He perceives auditory and visual hallucinations.         Mood and Affect: Mood and affect normal. Affect is not tearful.         Speech: Speech normal. Speech is not tangential.         Behavior: Behavior is cooperative.         Thought Content: Thought content is paranoid. Thought content does not include homicidal or suicidal ideation. Thought content does not include homicidal or suicidal plan.         Cognition and Memory: Cognition and memory normal.         Judgment: Judgment is impulsive.       Blood pressure 133/79, pulse 88, temperature 97.7 °F (36.5 °C), height 182.9 cm (72.01\"), weight 77.9 kg (171 lb 12.8 oz), SpO2 97 %.    Medication List:   Current Outpatient Medications   Medication Sig Dispense Refill   • divalproex (Depakote ER) 500 MG 24 hr tablet Take 2 tablets by mouth Daily for 90 days. 180 tablet 0   • OLANZapine (ZyPREXA) 2.5 MG tablet Take 3 tablets by mouth Every Night for 30 days. Take 2.5 mg in the AM and 2.5 mg in the afternoon. 90 tablet 1   • topiramate (Topamax) 25 MG tablet Take 1 tablet by mouth Every Night for 30 days. 30 tablet 1     No current facility-administered medications for this visit.       Mental Status Exam:   Hygiene:   good  Cooperation:  Cooperative  Eye Contact:  Good  Psychomotor Behavior:  Appropriate  Affect:  Full range and Appropriate  Hopelessness: Optimistic  Speech:  Normal  Thought Process:  Goal directed and Circum  Thought Content:  Normal and Mood congruent  Suicidal:  None  Homicidal:  None  Hallucinations:  Visual  Delusion:  Paranoid  Memory:  Intact  Orientation:  Person, Place, Time and Situation  Reliability:  fair  Insight:  Fair  Judgement:  Fair  Impulse Control:  Fair  Physical/Medical Issues:  No       Kenney Jacob  reports that he quit smoking about " 2 weeks ago. His smoking use included cigarettes. He has a 5.00 pack-year smoking history. He has never used smokeless tobacco.. I have educated him on the risk of diseases from using tobacco products such as cancer, COPD and heart disease.     I advised him to quit and he is not willing to quit.    I spent 3  minutes counseling the patient.      Assessment & Plan   Problems Addressed this Visit    None     Visit Diagnoses     Nightmares    -  Primary    Relevant Medications    OLANZapine (ZyPREXA) 2.5 MG tablet    topiramate (Topamax) 25 MG tablet    Schizoaffective disorder, bipolar type (HCC)        Relevant Medications    OLANZapine (ZyPREXA) 2.5 MG tablet    divalproex (Depakote ER) 500 MG 24 hr tablet    Mood disorder (HCC)        Relevant Medications    OLANZapine (ZyPREXA) 2.5 MG tablet    divalproex (Depakote ER) 500 MG 24 hr tablet      Diagnoses       Codes Comments    Nightmares    -  Primary ICD-10-CM: F51.5  ICD-9-CM: 307.47     Schizoaffective disorder, bipolar type (HCC)     ICD-10-CM: F25.0  ICD-9-CM: 295.70     Mood disorder (HCC)     ICD-10-CM: F39  ICD-9-CM: 296.90           -Continue to build therapeutic alliance and rapport  -Zyprexa to 2.5 mg p.o. twice daily for mood to total 5 mg dosing.  Did discuss with patient that in approximately 2 weeks if hallucinations are remaining persistent and more prominent to increase dose to 7.5 mg daily which will total of 3 pills.  -Continue Depakote  mg PO twice daily for mood   -Medications sent to pharmacy at this time.     Discussed medication and psychotherapy options.  Patient is tolerating medications at this time without negative effects.  Overall, patient has been maintaining mood stability and stable functioning.  He is noting at this time worsening symptomology however I do believe that this is related to him taking a decreased dose and Zyprexa then prescribed.  I have discussed with patient the importance of taking increased dose and  medication as prescribed.  Discussed with patient that Zyprexa does target hallucinations and paranoia more effectively than what Depakote does.  Patient has trialed prazosin in the past with negative side effects.  Patient reports that the increase in nightmares are negatively impacting sleep and he feels that reducing the nightmares may improve sleep quality.  Starting Topamax 25 mg for nightmares: Reduced dose as patient is on Depakote, recommended dose for nightmares is 75.  Discussed that Topamax can exacerbate the negative side effects with Depakote.  Instructed patient that if he noticed worsening in mood or negative side effects to discontinue.  Thoroughly discussed increased risk of weight gain, increased fasting glucose, increased serum A1c, increased serum lipids, risk for abnormal muscle movements, and or weight gain with the use of SGA's.  Reintegrated the potential risk and black box warning of hepatotoxicity and pancreatitis associated with Depakote use.  Discussed common side effects of Topamax: Dizziness, paresthesias, fatigue, taste changes, appetite changes, etc.Reviewed the risks, benefits, and side effects of the medications; patient acknowledged and verbally consented.  Patient is agreeable to call the [facility].  Patient is aware to call 911 or go to the nearest ER should begin having SI/HI.      SUPPORTIVE PSYCHOTHERAPY: continuing efforts to promote the therapeutic alliance, address the patient’s issues, and strengthen self awareness, insights, and coping skills.Assisted patient in processing above session content; acknowledged and normalized patient’s thoughts, feelings, and concerns.  Applied  positive coping skills and behavior management in session.Allowed patient to freely discuss issues without interruption or judgment. Provided safe, confidential environment to facilitate the development of positive therapeutic relationship and encourage open, honest communication. Assisted  patient in identifying risk factors which would indicate the need for higher level of care including thoughts to harm self or others and/or self-harming behavior and encouraged patient to contact this office, call 911, or present to the nearest emergency room should any of these events occur. Discussed crisis intervention services and means to access.  Patient adamantly and convincingly denies current suicidal or homicidal ideation or perceptual disturbance.    Return in about 8 weeks (around 10/20/2022), or if symptoms worsen or fail to improve, for Next scheduled follow up.        This document has been electronically signed by AREN Frederick  August 25, 2022 15:06 EDT   Errors in dictation may reflect use of voice recognition software and not all errors in transcription may have been detected prior to signing.

## 2022-10-25 ENCOUNTER — OFFICE VISIT (OUTPATIENT)
Dept: PSYCHIATRY | Facility: CLINIC | Age: 37
End: 2022-10-25

## 2022-10-25 VITALS
RESPIRATION RATE: 16 BRPM | OXYGEN SATURATION: 100 % | DIASTOLIC BLOOD PRESSURE: 76 MMHG | TEMPERATURE: 96.9 F | WEIGHT: 157.6 LBS | HEIGHT: 72 IN | BODY MASS INDEX: 21.35 KG/M2 | SYSTOLIC BLOOD PRESSURE: 126 MMHG | HEART RATE: 78 BPM

## 2022-10-25 DIAGNOSIS — F25.0 SCHIZOAFFECTIVE DISORDER, BIPOLAR TYPE: ICD-10-CM

## 2022-10-25 DIAGNOSIS — F39 MOOD DISORDER: ICD-10-CM

## 2022-10-25 PROCEDURE — 99214 OFFICE O/P EST MOD 30 MIN: CPT

## 2022-10-25 RX ORDER — OLANZAPINE 7.5 MG/1
7.5 TABLET ORAL NIGHTLY
Qty: 90 TABLET | Refills: 0 | Status: SHIPPED | OUTPATIENT
Start: 2022-10-25 | End: 2023-01-25

## 2022-10-25 RX ORDER — DIVALPROEX SODIUM 500 MG/1
1000 TABLET, EXTENDED RELEASE ORAL DAILY
Qty: 180 TABLET | Refills: 0 | Status: SHIPPED | OUTPATIENT
Start: 2022-10-25 | End: 2023-01-25

## 2022-10-25 NOTE — PROGRESS NOTES
"  Subjective   Kenney Jacob is a 37 y.o. male is here today for medication management follow-up.     Chief Complaint:  Schizoaffective: Bipolar type    History of Present Illness:     Patient denies negative side effects with current regimen.  He reports that he trialed Topamax for 3 days and discontinued related to side effects.  Reports that he has \"dabbled a few weeks ago with some of my meds and did not take them every day... The delusions were way worse.... It got better after I restarted my Zyprexa.\"  Reports that delusional material is limited when on current regimen of Zyprexa.  Reports that moods have been \"occasionally up and down.\"  Denies any overt or lasting feelings of depression at this time.  Reports that since last encounter he did have an episode of hypomania lasting for approximately 3 to 5 days of decreased sleep, increased agitation, and increased energy levels.  He reports that anger is much better than it was initially: However he feels that it is still negatively impactful.  Reports that is situationally mitigated though.  Denies any physical aggression or destruction of property.  Reports worry associated with his father's health.  Denies any occurrence of panic.  Auditory and visual hallucinations remain present.  Patient did ask for reassurance to ensure there was not a snake on the end table in my office.  He reports that most of the time he is able to differentiate between hallucinations and reality.  Appetite has reduced.  Reports that he is continuing to eat 3 meals per day.  14 pounds lost since last and office encounter.Body mass index is 21.37 kg/m².  Denies any command hallucinations.  Denies self-harm.  Denies SI and HI.  Continues to work.  Continues to live with mom and dad.  Mother is aiding in medication compliance.    The following portions of the patient's history were reviewed and updated as appropriate: allergies, current medications, past family history, past medical " history, past social history, past surgical history and problem list.    Review of Systems   Constitutional: Negative for activity change, appetite change, fatigue and unexpected weight change.   HENT: Negative for drooling and tinnitus.    Eyes: Negative for photophobia, redness and visual disturbance.   Respiratory: Negative for chest tightness and shortness of breath.    Cardiovascular: Negative for chest pain and palpitations.   Gastrointestinal: Negative for abdominal pain, diarrhea and vomiting.   Endocrine: Negative for polydipsia and polyuria.   Genitourinary: Negative for frequency and urgency.   Musculoskeletal: Negative for arthralgias and gait problem.   Allergic/Immunologic: Negative for environmental allergies and immunocompromised state.   Neurological: Negative for dizziness, tremors, seizures, syncope, facial asymmetry, speech difficulty, weakness, light-headedness, numbness and headaches.   Hematological: Negative for adenopathy. Does not bruise/bleed easily.   Psychiatric/Behavioral: Positive for agitation, hallucinations and sleep disturbance. Negative for behavioral problems, confusion, decreased concentration, dysphoric mood, self-injury and suicidal ideas. The patient is not nervous/anxious and is not hyperactive.        Objective   Physical Exam  Vitals reviewed.   Constitutional:       Appearance: Normal appearance. He is normal weight.   HENT:      Head: Normocephalic.      Nose: Nose normal.      Mouth/Throat:      Mouth: Mucous membranes are moist.      Pharynx: Oropharynx is clear.   Eyes:      Extraocular Movements: Extraocular movements intact.      Conjunctiva/sclera: Conjunctivae normal.      Pupils: Pupils are equal, round, and reactive to light.   Cardiovascular:      Rate and Rhythm: Normal rate.   Pulmonary:      Effort: Pulmonary effort is normal.   Musculoskeletal:         General: Normal range of motion.      Cervical back: Normal range of motion.   Skin:     General: Skin is  "warm and dry.   Neurological:      General: No focal deficit present.      Mental Status: He is alert and oriented to person, place, and time. Mental status is at baseline.   Psychiatric:         Attention and Perception: Attention normal. He perceives auditory and visual hallucinations.         Mood and Affect: Mood and affect normal. Affect is not tearful.         Speech: Speech normal. Speech is not tangential.         Behavior: Behavior is cooperative.         Thought Content: Thought content is paranoid. Thought content does not include homicidal or suicidal ideation. Thought content does not include homicidal or suicidal plan.         Cognition and Memory: Cognition and memory normal.         Judgment: Judgment is impulsive.       Blood pressure 126/76, pulse 78, temperature 96.9 °F (36.1 °C), temperature source Temporal, resp. rate 16, height 182.9 cm (72\"), weight 71.5 kg (157 lb 9.6 oz), SpO2 100 %.    Medication List:   Current Outpatient Medications   Medication Sig Dispense Refill   • divalproex (Depakote ER) 500 MG 24 hr tablet Take 2 tablets by mouth Daily for 90 days. 180 tablet 0   • OLANZapine (ZyPREXA) 7.5 MG tablet Take 1 tablet by mouth Every Night for 90 days. 90 tablet 0     No current facility-administered medications for this visit.       Mental Status Exam:   Hygiene:   good  Cooperation:  Cooperative  Eye Contact:  Good  Psychomotor Behavior:  Appropriate  Affect:  Full range and Appropriate  Hopelessness: Optimistic  Speech:  Normal  Thought Process:  Goal directed and Circum  Thought Content:  Normal and Mood congruent  Suicidal:  None  Homicidal:  None  Hallucinations:  Visual  Delusion:  Paranoid  Memory:  Intact  Orientation:  Person, Place, Time and Situation  Reliability:  fair  Insight:  Fair  Judgement:  Fair  Impulse Control:  Fair  Physical/Medical Issues:  Erin Jacob  reports that he quit smoking about 2 months ago. His smoking use included cigarettes. He has a 5.00 " pack-year smoking history. He has never used smokeless tobacco.. I have educated him on the risk of diseases from using tobacco products such as cancer, COPD and heart disease.     I advised him to quit and he is not willing to quit.    I spent 3  minutes counseling the patient.      Assessment & Plan   Problems Addressed this Visit    None  Visit Diagnoses     Schizoaffective disorder, bipolar type (HCC)        Relevant Medications    OLANZapine (ZyPREXA) 7.5 MG tablet    divalproex (Depakote ER) 500 MG 24 hr tablet    Mood disorder (HCC)        Relevant Medications    OLANZapine (ZyPREXA) 7.5 MG tablet    divalproex (Depakote ER) 500 MG 24 hr tablet      Diagnoses       Codes Comments    Schizoaffective disorder, bipolar type (HCC)     ICD-10-CM: F25.0  ICD-9-CM: 295.70     Mood disorder (HCC)     ICD-10-CM: F39  ICD-9-CM: 296.90           -Continue to build therapeutic alliance and rapport  -Continue Zyprexa 7.5 mg PO daily.   -Continue Depakote  mg PO twice daily for mood   -Medications sent to pharmacy at this time.     Discussed medication and psychotherapy options.  Discussed with patient increasing Zyprexa dosing to better target anger and irritability, he is hesitant denies at this time.  Patient is to continue Depakote and Zyprexa as ordered.  Labs obtained in April were stable.  Plan to repeat labs in the next 3 to 6 months.  Reintegrated increased risk of weight gain, increased fasting glucose, increased serum A1c, increased serum lipids, risk for abnormal muscle movements, and or weight gain with the use of SGA's.  Reintegrated the potential risk and black box warning of hepatotoxicity and pancreatitis associated with Depakote use. Reviewed the risks, benefits, and side effects of the medications; patient acknowledged and verbally consented.  Patient is agreeable to call the [facility].  Patient is aware to call 911 or go to the nearest ER should begin having SI/HI.      SUPPORTIVE  PSYCHOTHERAPY: continuing efforts to promote the therapeutic alliance, address the patient’s issues, and strengthen self awareness, insights, and coping skills.Assisted patient in processing above session content; acknowledged and normalized patient’s thoughts, feelings, and concerns.  Applied  positive coping skills and behavior management in session.Allowed patient to freely discuss issues without interruption or judgment. Provided safe, confidential environment to facilitate the development of positive therapeutic relationship and encourage open, honest communication. Assisted patient in identifying risk factors which would indicate the need for higher level of care including thoughts to harm self or others and/or self-harming behavior and encouraged patient to contact this office, call 911, or present to the nearest emergency room should any of these events occur. Discussed crisis intervention services and means to access.  Patient adamantly and convincingly denies current suicidal or homicidal ideation or perceptual disturbance.    Return in about 3 months (around 1/25/2023), or if symptoms worsen or fail to improve, for Next scheduled follow up.        This document has been electronically signed by AREN Frederick  October 25, 2022 09:45 EDT   Errors in dictation may reflect use of voice recognition software and not all errors in transcription may have been detected prior to signing.

## 2023-01-25 ENCOUNTER — OFFICE VISIT (OUTPATIENT)
Dept: PSYCHIATRY | Facility: CLINIC | Age: 38
End: 2023-01-25
Payer: MEDICAID

## 2023-01-25 VITALS
HEART RATE: 96 BPM | HEIGHT: 72 IN | OXYGEN SATURATION: 97 % | BODY MASS INDEX: 20.78 KG/M2 | DIASTOLIC BLOOD PRESSURE: 93 MMHG | WEIGHT: 153.4 LBS | SYSTOLIC BLOOD PRESSURE: 142 MMHG | TEMPERATURE: 97.8 F

## 2023-01-25 DIAGNOSIS — F25.0 SCHIZOAFFECTIVE DISORDER, BIPOLAR TYPE: ICD-10-CM

## 2023-01-25 DIAGNOSIS — F39 MOOD DISORDER: ICD-10-CM

## 2023-01-25 PROCEDURE — 99215 OFFICE O/P EST HI 40 MIN: CPT

## 2023-01-25 RX ORDER — QUETIAPINE 300 MG/1
300 TABLET, FILM COATED, EXTENDED RELEASE ORAL NIGHTLY
Qty: 30 TABLET | Refills: 1 | Status: SHIPPED | OUTPATIENT
Start: 2023-01-25 | End: 2023-02-27

## 2023-01-25 NOTE — PROGRESS NOTES
Subjective   Kenney Jacob is a 37 y.o. male is here today for medication management follow-up.  Father present for the first 5 minutes of encounter with patient permission, then left back to the waiting room.    Chief Complaint:  Schizoaffective: Bipolar type    History of Present Illness:     Patient reports that he has not had his medications since November.  Patient reports that he discontinued his medication because he feels that he noticed blood in his stool and this resolved after discontinuing the medication.  Father reports that he has noticed an increase in agitation and anger outburst.  Denies any physical violence.  Patient reports that he has been sleeping 1 to 2 hours per night.  Patient reports that he has been up working in the [employer], has a difficult time stopping activities and finishing them.  Reports that he has been working until his hands bleed.  He reports that he knows that he is manic.  Reports an increase in paranoia.  Reports that he feels that his father has shorted him money and has been lying to him.  Reports he has had an increase in negative self thoughts.  Increased energy levels.  Increased agitation.  Reports that he has had an increase in delusional thought content, feeling that others are out to get him.  Reports that he feels he is able to redirect these and have insight to these in time.  Reports that he feels that medication is not helpful and feels that supplements would be a better option.  Appetite has been reduced.  Reports that he has been snacking on [employer] cakes. Body mass index is 20.8 kg/m². Denies any consumption of meals, just snacking.  Denies any overt feelings of depression.  Denies any command hallucinations.  Denies any self-harm actions.  Denies SI and HI.    The following portions of the patient's history were reviewed and updated as appropriate: allergies, current medications, past family history, past medical history, past social history, past  surgical history and problem list.    Review of Systems   Constitutional: Positive for activity change and appetite change. Negative for fatigue and unexpected weight change.   HENT: Negative for drooling and tinnitus.    Eyes: Negative for photophobia, redness and visual disturbance.   Respiratory: Negative for chest tightness and shortness of breath.    Cardiovascular: Negative for chest pain and palpitations.   Gastrointestinal: Negative for abdominal pain, diarrhea and vomiting.   Endocrine: Negative for polydipsia and polyuria.   Genitourinary: Negative for frequency and urgency.   Musculoskeletal: Negative for arthralgias and gait problem.   Allergic/Immunologic: Negative for environmental allergies and immunocompromised state.   Neurological: Negative for dizziness, tremors, seizures, syncope, facial asymmetry, speech difficulty, weakness, light-headedness, numbness and headaches.   Hematological: Negative for adenopathy. Does not bruise/bleed easily.   Psychiatric/Behavioral: Positive for agitation, decreased concentration, hallucinations and sleep disturbance. Negative for behavioral problems, confusion, dysphoric mood, self-injury and suicidal ideas. The patient is hyperactive. The patient is not nervous/anxious.        Objective   Physical Exam  Vitals reviewed.   Constitutional:       Appearance: Normal appearance. He is normal weight.   HENT:      Head: Normocephalic.      Nose: Nose normal.      Mouth/Throat:      Mouth: Mucous membranes are moist.      Pharynx: Oropharynx is clear.   Eyes:      Extraocular Movements: Extraocular movements intact.      Conjunctiva/sclera: Conjunctivae normal.      Pupils: Pupils are equal, round, and reactive to light.   Cardiovascular:      Rate and Rhythm: Normal rate.   Pulmonary:      Effort: Pulmonary effort is normal.   Musculoskeletal:         General: Normal range of motion.      Cervical back: Normal range of motion.   Skin:     General: Skin is warm and dry.  "  Neurological:      General: No focal deficit present.      Mental Status: He is alert and oriented to person, place, and time. Mental status is at baseline.   Psychiatric:         Attention and Perception: Attention normal. He perceives auditory and visual hallucinations.         Mood and Affect: Mood normal. Affect is labile and angry. Affect is not tearful.         Speech: Speech is rapid and pressured and tangential.         Behavior: Behavior is cooperative.         Thought Content: Thought content is paranoid. Thought content is not delusional. Thought content does not include homicidal or suicidal ideation. Thought content does not include homicidal or suicidal plan.         Cognition and Memory: Cognition and memory normal.         Judgment: Judgment is impulsive.       Blood pressure 142/93, pulse 96, temperature 97.8 °F (36.6 °C), height 182.9 cm (72.01\"), weight 69.6 kg (153 lb 6.4 oz), SpO2 97 %.    Medication List:   Current Outpatient Medications   Medication Sig Dispense Refill   • QUEtiapine XR (SEROquel XR) 300 MG 24 hr tablet Take 1 tablet by mouth Every Night. 30 tablet 1     No current facility-administered medications for this visit.       Mental Status Exam:   Hygiene:   good  Cooperation:  Mostly cooperative, at times suspicious  Eye Contact:  Fair  Psychomotor Behavior:  Restless  Affect:  Labile  Hopelessness: Denies  Speech:  Pressured and Rapid  Thought Process:  Tangential  Thought Content:  Normal and Mood congruent  Suicidal:  None  Homicidal:  None  Hallucinations:  Auditory and Visual  Delusion:  Paranoid  Memory:  Intact  Orientation:  Person, Place, Time and Situation  Reliability:  fair  Insight:  Poor  Judgement:  Fair  Impulse Control:  Fair  Physical/Medical Issues:  Erin Jacob  reports that he quit smoking about 5 months ago. His smoking use included cigarettes. He has a 5.00 pack-year smoking history. He has never used smokeless tobacco.. I have educated him on " the risk of diseases from using tobacco products such as cancer, COPD and heart disease.     I advised him to quit and he is not willing to quit.    I spent 3  minutes counseling the patient.      Assessment & Plan   Problems Addressed this Visit    None  Visit Diagnoses     Schizoaffective disorder, bipolar type (HCC)        Relevant Medications    QUEtiapine XR (SEROquel XR) 300 MG 24 hr tablet    Mood disorder (HCC)        Relevant Medications    QUEtiapine XR (SEROquel XR) 300 MG 24 hr tablet      Diagnoses       Codes Comments    Schizoaffective disorder, bipolar type (HCC)     ICD-10-CM: F25.0  ICD-9-CM: 295.70     Mood disorder (HCC)     ICD-10-CM: F39  ICD-9-CM: 296.90           -Continue to build therapeutic alliance and rapport  -Zyprexa and Depakote discontinued between encounter by patient, does not wish to restart at this time.  -Start Seroquel  mg p.o. nightly for schizoaffective disorder.  -Medications sent to pharmacy at this time.     Discussed medication and psychotherapy options.  Extensively discussed medication options and no medication treatment.  Patient does request medication to aid in sleep as he feels that improving sleep would be beneficial for him.  Patient is agreeable to start Seroquel  mg nightly.  Did discuss ramping dose, patient declines at this time.  Discussed risk factors that can be seen with the use of Seroquel: increased risk of weight gain, increased fasting glucose, increased serum A1c, increased serum lipids, risk for abnormal muscle movements, and or weight gain with the use of SGA's.Reviewed the risks, benefits, and side effects of the medications; patient acknowledged and verbally consented.  Patient is agreeable to call the [facility].  Patient is aware to call 911 or go to the nearest ER should begin having SI/HI.      SUPPORTIVE PSYCHOTHERAPY: continuing efforts to promote the therapeutic alliance, address the patient’s issues, and strengthen self  awareness, insights, and coping skills.Assisted patient in processing above session content; acknowledged and normalized patient’s thoughts, feelings, and concerns.  Applied  positive coping skills and behavior management in session.Allowed patient to freely discuss issues without interruption or judgment. Provided safe, confidential environment to facilitate the development of positive therapeutic relationship and encourage open, honest communication. Assisted patient in identifying risk factors which would indicate the need for higher level of care including thoughts to harm self or others and/or self-harming behavior and encouraged patient to contact this office, call 911, or present to the nearest emergency room should any of these events occur. Discussed crisis intervention services and means to access.  Patient adamantly and convincingly denies current suicidal or homicidal ideation or perceptual disturbance.    Return in about 4 weeks (around 2/22/2023), or if symptoms worsen or fail to improve, for Next scheduled follow up.        This document has been electronically signed by AREN Frederick  January 25, 2023 10:43 EST   Errors in dictation may reflect use of voice recognition software and not all errors in transcription may have been detected prior to signing.       Spent a total of 57 minutes face-to-face with patient discussing diagnosis, creating a plan of care, building rapport, discussing medication options and potential side effects, and ordering medications.

## 2023-02-27 ENCOUNTER — OFFICE VISIT (OUTPATIENT)
Dept: PSYCHIATRY | Facility: CLINIC | Age: 38
End: 2023-02-27
Payer: MEDICAID

## 2023-02-27 VITALS
HEART RATE: 96 BPM | SYSTOLIC BLOOD PRESSURE: 143 MMHG | HEIGHT: 72 IN | RESPIRATION RATE: 16 BRPM | OXYGEN SATURATION: 99 % | WEIGHT: 155 LBS | BODY MASS INDEX: 20.99 KG/M2 | DIASTOLIC BLOOD PRESSURE: 91 MMHG | TEMPERATURE: 98.4 F

## 2023-02-27 DIAGNOSIS — F39 MOOD DISORDER: ICD-10-CM

## 2023-02-27 DIAGNOSIS — F51.05 INSOMNIA DUE TO MENTAL CONDITION: ICD-10-CM

## 2023-02-27 DIAGNOSIS — F25.0 SCHIZOAFFECTIVE DISORDER, BIPOLAR TYPE: Primary | ICD-10-CM

## 2023-02-27 PROCEDURE — 99214 OFFICE O/P EST MOD 30 MIN: CPT

## 2023-02-27 RX ORDER — DIVALPROEX SODIUM 125 MG/1
125 TABLET, DELAYED RELEASE ORAL 2 TIMES DAILY
Qty: 60 TABLET | Refills: 1 | Status: SHIPPED | OUTPATIENT
Start: 2023-02-27 | End: 2023-03-29

## 2023-02-27 RX ORDER — QUETIAPINE FUMARATE 150 MG/1
150 TABLET, FILM COATED ORAL 2 TIMES DAILY
Qty: 60 TABLET | Refills: 1 | Status: SHIPPED | OUTPATIENT
Start: 2023-02-27 | End: 2023-03-29

## 2023-02-27 NOTE — PROGRESS NOTES
"  Subjective   Kenney Jacob is a 37 y.o. male is here today for medication management follow-up.      Chief Complaint:  Schizoaffective: Bipolar type    History of Present Illness:     Patient reports no negative side effects associated with the initiation of Seroquel.  He reports that he feels that the dose is \"too strong.\"  He has had a reduction in energy, reduction in impulse control, reduction in irritability and anger, improved sleep, reduced paranoia, and reduced racing thoughts.  Reports that anxiety has been well.  Does appear to be responding to internal and external stimuli throughout encounter, pleasant and cooperative.  Reports reduced goal directed activity.  Reports that he has been taking Depakote again as needed as he feels that \"it is helpful\".  Reports a reduction in negative thoughts.  Appetite has started to improve.  2-3 meals per day.  2 pounds gain since last encounter. Body mass index is 21.02 kg/m².  Sleep is averaging 8 to 9 hours.  Denies any nightmares.  Denies any overt feelings of depression.  Denies any command hallucinations.  Denies any self-harm actions.  Denies SI and HI.    The following portions of the patient's history were reviewed and updated as appropriate: allergies, current medications, past family history, past medical history, past social history, past surgical history and problem list.    Review of Systems   Constitutional: Positive for activity change and appetite change. Negative for fatigue and unexpected weight change.   HENT: Negative for drooling and tinnitus.    Eyes: Negative for photophobia, redness and visual disturbance.   Respiratory: Negative for chest tightness and shortness of breath.    Cardiovascular: Negative for chest pain and palpitations.   Gastrointestinal: Negative for abdominal pain, diarrhea and vomiting.   Endocrine: Negative for polydipsia and polyuria.   Genitourinary: Negative for frequency and urgency.   Musculoskeletal: Negative for " arthralgias and gait problem.   Allergic/Immunologic: Negative for environmental allergies and immunocompromised state.   Neurological: Negative for dizziness, tremors, seizures, syncope, facial asymmetry, speech difficulty, weakness, light-headedness, numbness and headaches.   Hematological: Negative for adenopathy. Does not bruise/bleed easily.   Psychiatric/Behavioral: Positive for agitation, decreased concentration, hallucinations and sleep disturbance. Negative for behavioral problems, confusion, dysphoric mood, self-injury and suicidal ideas. The patient is hyperactive. The patient is not nervous/anxious.        Objective   Physical Exam  Vitals reviewed.   Constitutional:       Appearance: Normal appearance. He is normal weight.   HENT:      Head: Normocephalic.      Nose: Nose normal.      Mouth/Throat:      Mouth: Mucous membranes are moist.      Pharynx: Oropharynx is clear.   Eyes:      Extraocular Movements: Extraocular movements intact.      Conjunctiva/sclera: Conjunctivae normal.      Pupils: Pupils are equal, round, and reactive to light.   Cardiovascular:      Rate and Rhythm: Normal rate.   Pulmonary:      Effort: Pulmonary effort is normal.   Musculoskeletal:         General: Normal range of motion.      Cervical back: Normal range of motion.   Skin:     General: Skin is warm and dry.   Neurological:      General: No focal deficit present.      Mental Status: He is alert and oriented to person, place, and time. Mental status is at baseline.   Psychiatric:         Attention and Perception: Attention normal. He perceives auditory and visual hallucinations.         Mood and Affect: Mood normal. Affect is labile and angry. Affect is not tearful.         Speech: Speech is rapid and pressured and tangential.         Behavior: Behavior is cooperative.         Thought Content: Thought content is paranoid. Thought content is not delusional. Thought content does not include homicidal or suicidal ideation.  "Thought content does not include homicidal or suicidal plan.         Cognition and Memory: Cognition and memory normal.         Judgment: Judgment is impulsive.       Blood pressure 143/91, pulse 96, temperature 98.4 °F (36.9 °C), temperature source Temporal, resp. rate 16, height 182.9 cm (72\"), weight 70.3 kg (155 lb), SpO2 99 %.    Medication List:   Current Outpatient Medications   Medication Sig Dispense Refill   • divalproex (Depakote) 125 MG DR tablet Take 1 tablet by mouth 2 (Two) Times a Day for 30 days. 60 tablet 1   • QUEtiapine 150 MG tablet Take 150 mg by mouth 2 (Two) Times a Day for 30 days. 60 tablet 1     No current facility-administered medications for this visit.       Mental Status Exam:   Hygiene:   good  Cooperation:  Cooperative  Eye Contact:  Fair  Psychomotor Behavior:  Restless  Affect:  Labile  Hopelessness: Denies  Speech:  Rambling  Thought Process:  Circum  Thought Content:  Normal and Mood congruent  Suicidal:  None  Homicidal:  None  Hallucinations:  Auditory and Visual  Delusion:  Paranoid  Memory:  Intact  Orientation:  Person, Place, Time and Situation  Reliability:  fair  Insight:  Poor  Judgement:  Fair  Impulse Control:  Fair  Physical/Medical Issues:  No       Kenney Jacob  reports that he has been smoking cigarettes. He has a 5.00 pack-year smoking history. He has never used smokeless tobacco.. I have educated him on the risk of diseases from using tobacco products such as cancer, COPD and heart disease.     I advised him to quit and he is not willing to quit.    I spent 3  minutes counseling the patient.      Assessment & Plan   Problems Addressed this Visit    None  Visit Diagnoses     Schizoaffective disorder, bipolar type (HCC)    -  Primary    Relevant Medications    QUEtiapine 150 MG tablet    divalproex (Depakote) 125 MG DR tablet    Mood disorder (HCC)        Relevant Medications    QUEtiapine 150 MG tablet    Insomnia due to mental condition        Relevant " Medications    QUEtiapine 150 MG tablet    divalproex (Depakote) 125 MG DR tablet      Diagnoses       Codes Comments    Schizoaffective disorder, bipolar type (HCC)    -  Primary ICD-10-CM: F25.0  ICD-9-CM: 295.70     Mood disorder (HCC)     ICD-10-CM: F39  ICD-9-CM: 296.90     Insomnia due to mental condition     ICD-10-CM: F51.05  ICD-9-CM: 300.9, 327.02           -Continue to build therapeutic alliance and rapport  -Restart Depakote at 125 p.o. twice daily for mood: Patient requests to be restarted at lowest possible dose  -Patient request to transition from XR Seroquel because he feels that dose is too strong  -Change to Seroquel 150 mg p.o. twice daily for mood  -Medications sent to pharmacy at this time.     Discussed medication and psychotherapy options.  Patient has significant difficulty with medication compliance and is very suspicious with medication.  At this time I am going to restart Depakote at low dose twice daily with hopes to increase as tolerated.  I am going to transition to twice a day dosing of Seroquel as described above.  Discussed risk factors that can be seen with the use of Seroquel: increased risk of weight gain, increased fasting glucose, increased serum A1c, increased serum lipids, risk for abnormal muscle movements, and or weight gain with the use of SGA's.Reviewed the risks, benefits, and side effects of the medications; patient acknowledged and verbally consented.  Patient is agreeable to call the [facility].  Patient is aware to call 911 or go to the nearest ER should begin having SI/HI.       Return in about 4 weeks (around 3/27/2023), or if symptoms worsen or fail to improve, for Next scheduled follow up.        This document has been electronically signed by AREN Frederick  February 27, 2023 12:52 EST   Errors in dictation may reflect use of voice recognition software and not all errors in transcription may have been detected prior to signing.

## 2025-03-27 ENCOUNTER — OFFICE VISIT (OUTPATIENT)
Dept: INTERNAL MEDICINE | Facility: CLINIC | Age: 40
End: 2025-03-27
Payer: MEDICAID

## 2025-03-27 VITALS
OXYGEN SATURATION: 97 % | SYSTOLIC BLOOD PRESSURE: 120 MMHG | DIASTOLIC BLOOD PRESSURE: 86 MMHG | TEMPERATURE: 96.8 F | HEIGHT: 72 IN | HEART RATE: 79 BPM | WEIGHT: 208 LBS | BODY MASS INDEX: 28.17 KG/M2

## 2025-03-27 DIAGNOSIS — S06.9X6S TRAUMATIC BRAIN INJURY, WITH LOSS OF CONSCIOUSNESS GREATER THAN 24 HOURS WITHOUT RETURN TO PRE-EXISTING CONSCIOUS LEVEL WITH PATIENT SURVIVING, SEQUELA: Primary | ICD-10-CM

## 2025-03-27 DIAGNOSIS — R06.81 APNEA: ICD-10-CM

## 2025-03-27 DIAGNOSIS — G25.81 RESTLESS LEG: ICD-10-CM

## 2025-03-27 DIAGNOSIS — R41.3 MEMORY LOSS: ICD-10-CM

## 2025-03-27 DIAGNOSIS — F25.9 SCHIZO AFFECTIVE SCHIZOPHRENIA: ICD-10-CM

## 2025-03-27 RX ORDER — PROPRANOLOL HYDROCHLORIDE 10 MG/1
10 TABLET ORAL EVERY EVENING
COMMUNITY
Start: 2025-02-26

## 2025-03-27 RX ORDER — BUPROPION HYDROCHLORIDE 150 MG/1
150 TABLET ORAL EVERY MORNING
COMMUNITY
Start: 2025-03-12

## 2025-03-27 RX ORDER — DIVALPROEX SODIUM 125 MG/1
1 TABLET, DELAYED RELEASE ORAL 2 TIMES DAILY
COMMUNITY

## 2025-03-27 RX ORDER — ZIPRASIDONE HYDROCHLORIDE 60 MG/1
60 CAPSULE ORAL 2 TIMES DAILY
COMMUNITY

## 2025-03-27 RX ORDER — FLUOXETINE HYDROCHLORIDE 40 MG/1
40 CAPSULE ORAL DAILY
COMMUNITY
Start: 2025-03-12

## 2025-03-27 RX ORDER — MULTIVIT WITH MINERALS/LUTEIN
1500 TABLET ORAL DAILY
COMMUNITY

## 2025-03-27 RX ORDER — ONDANSETRON 4 MG/1
4 TABLET, FILM COATED ORAL EVERY 8 HOURS PRN
COMMUNITY

## 2025-03-27 RX ORDER — VITAMIN B COMPLEX
CAPSULE ORAL DAILY
COMMUNITY

## 2025-03-27 RX ORDER — CETIRIZINE HYDROCHLORIDE 10 MG/1
10 TABLET ORAL
COMMUNITY
Start: 2025-01-06

## 2025-03-27 NOTE — PROGRESS NOTES
"Subjective     Patient ID: Kenney Jacob is a 39 y.o. male. Patient is here for management of multiple medical problems.     Chief Complaint   Patient presents with    Traumatic Brain Injury     History of Present Illness   TBI, feel out of [place] 2 years go.    Poor sleep. Wakes and unable to get back to sleep. Tried all day long.  Napping all day long.        Very jumpy legs.        The following portions of the patient's history were reviewed and updated as appropriate: allergies, current medications, past family history, past medical history, past social history, past surgical history and problem list.    Review of Systems    Current Outpatient Medications:     B Complex Vitamins (vitamin b complex) capsule capsule, Take  by mouth Daily., Disp: , Rfl:     buPROPion XL (WELLBUTRIN XL) 150 MG 24 hr tablet, Take 1 tablet by mouth Every Morning., Disp: , Rfl:     cetirizine (zyrTEC) 10 MG tablet, Take 1 tablet by mouth every night at bedtime., Disp: , Rfl:     divalproex (DEPAKOTE) 125 MG DR tablet, Take 1 tablet by mouth 2 (Two) Times a Day., Disp: , Rfl:     FLUoxetine (PROzac) 40 MG capsule, Take 1 capsule by mouth Daily., Disp: , Rfl:     ondansetron (ZOFRAN) 4 MG tablet, Take 1 tablet by mouth Every 8 (Eight) Hours As Needed for Nausea., Disp: , Rfl:     propranolol (INDERAL) 10 MG tablet, Take 1 tablet by mouth Every Evening., Disp: , Rfl:     vitamin C (ASCORBIC ACID) 250 MG tablet, Take 6 tablets by mouth Daily., Disp: , Rfl:     ziprasidone (GEODON) 60 MG capsule, Take 1 capsule by mouth 2 (Two) Times a Day., Disp: , Rfl:     Objective      Blood pressure 120/86, pulse 79, temperature 96.8 °F (36 °C), height 182.9 cm (72\"), weight 94.3 kg (208 lb), SpO2 97%.    BMI is >= 25 and <30. (Overweight) The following options were offered after discussion;: weight loss educational material (shared in after visit summary), exercise counseling/recommendations, and nutrition counseling/recommendations       Physical " Exam     General Appearance:    Alert, cooperative, no distress, appears stated age   Head:    Normocephalic, without obvious abnormality, atraumatic   Eyes:    PERRL, conjunctiva/corneas clear, EOM's intact   Ears:    Normal TM's and external ear canals, both ears   Nose:   Nares normal, septum midline, mucosa normal, no drainage   or sinus tenderness   Throat:   Lips, mucosa, and tongue normal; teeth and gums normal   Neck:   Supple, symmetrical, trachea midline, no adenopathy;        thyroid:  No enlargement/tenderness/nodules; no carotid    bruit or JVD   Back:     Symmetric, no curvature, ROM normal, no CVA tenderness   Lungs:     Clear to auscultation bilaterally, respirations unlabored   Chest wall:    No tenderness or deformity   Heart:    Regular rate and rhythm, S1 and S2 normal, no murmur,        rub or gallop   Abdomen:     Soft, non-tender, bowel sounds active all four quadrants,     no masses, no organomegaly   Extremities:   Extremities normal, atraumatic, no cyanosis or edema   Pulses:   2+ and symmetric all extremities   Skin:   Skin color, texture, turgor normal, no rashes or lesions   Lymph nodes:   Cervical, supraclavicular, and axillary nodes normal   Neurologic:   CNII-XII intact. Normal strength, sensation and reflexes       throughout      Results for orders placed or performed in visit on 05/26/22   Valproic Acid Level, Total    Collection Time: 05/26/22  8:50 AM    Specimen: Blood   Result Value Ref Range    Valproic Acid 77.3 50.0 - 125.0 mcg/mL   Vitamin D 1,25 Dihydroxy    Collection Time: 05/26/22  8:50 AM    Specimen: Blood   Result Value Ref Range    1,25-Dihydroxy, Vitamin D 46.0 19.9 - 79.3 pg/mL   Vitamin B12    Collection Time: 05/26/22  8:50 AM    Specimen: Blood   Result Value Ref Range    Vitamin B-12 1,119 (H) 211 - 946 pg/mL   Comprehensive Metabolic Panel    Collection Time: 05/26/22  8:50 AM    Specimen: Blood   Result Value Ref Range    Glucose 113 (H) 65 - 99 mg/dL    BUN  13 6 - 20 mg/dL    Creatinine 1.11 0.76 - 1.27 mg/dL    Sodium 141 136 - 145 mmol/L    Potassium 3.8 3.5 - 5.2 mmol/L    Chloride 105 98 - 107 mmol/L    CO2 23.0 22.0 - 29.0 mmol/L    Calcium 8.8 8.6 - 10.5 mg/dL    Total Protein 5.7 (L) 6.0 - 8.5 g/dL    Albumin 4.10 3.50 - 5.20 g/dL    ALT (SGPT) 43 (H) 1 - 41 U/L    AST (SGOT) 29 1 - 40 U/L    Alkaline Phosphatase 68 39 - 117 U/L    Total Bilirubin 0.9 0.0 - 1.2 mg/dL    Globulin 1.6 gm/dL    A/G Ratio 2.6 g/dL    BUN/Creatinine Ratio 11.7 7.0 - 25.0    Anion Gap 13.0 5.0 - 15.0 mmol/L    eGFR 87.2 >60.0 mL/min/1.73   Magnesium    Collection Time: 05/26/22  8:50 AM    Specimen: Blood   Result Value Ref Range    Magnesium 2.0 1.6 - 2.6 mg/dL   CBC Auto Differential    Collection Time: 05/26/22  8:50 AM    Specimen: Blood   Result Value Ref Range    WBC 5.99 3.40 - 10.80 10*3/mm3    RBC 4.96 4.14 - 5.80 10*6/mm3    Hemoglobin 14.6 13.0 - 17.7 g/dL    Hematocrit 42.9 37.5 - 51.0 %    MCV 86.5 79.0 - 97.0 fL    MCH 29.4 26.6 - 33.0 pg    MCHC 34.0 31.5 - 35.7 g/dL    RDW 12.6 12.3 - 15.4 %    RDW-SD 39.5 37.0 - 54.0 fl    MPV 10.7 6.0 - 12.0 fL    Platelets 144 140 - 450 10*3/mm3    Neutrophil % 40.7 (L) 42.7 - 76.0 %    Lymphocyte % 36.4 19.6 - 45.3 %    Monocyte % 12.4 (H) 5.0 - 12.0 %    Eosinophil % 9.5 (H) 0.3 - 6.2 %    Basophil % 0.8 0.0 - 1.5 %    Immature Grans % 0.2 0.0 - 0.5 %    Neutrophils, Absolute 2.44 1.70 - 7.00 10*3/mm3    Lymphocytes, Absolute 2.18 0.70 - 3.10 10*3/mm3    Monocytes, Absolute 0.74 0.10 - 0.90 10*3/mm3    Eosinophils, Absolute 0.57 (H) 0.00 - 0.40 10*3/mm3    Basophils, Absolute 0.05 0.00 - 0.20 10*3/mm3    Immature Grans, Absolute 0.01 0.00 - 0.05 10*3/mm3    nRBC 0.0 0.0 - 0.2 /100 WBC   TSH Rfx On Abnormal To Free T4    Collection Time: 05/26/22  8:50 AM    Specimen: Blood   Result Value Ref Range    TSH 1.710 0.270 - 4.200 uIU/mL         Assessment & Plan   TBI.  Many meds that can affect sleep and or cause central sleep apnea.  Pt has Medicaid ins. Will have to set up with sleep clinic.   Home study first would be ideal.  If in house sleep study, it will need to be in [place]. Looking in throat, redundant tissue and narrowed.  Good chance, if apnea if found, will be jann    Intubated x 3 week. Never transitioned to trach.     Diagnoses and all orders for this visit:    1. 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 Schizo affective schizophrenia  -     Vitamin B12  -     CBC & Differential  -     Comprehensive Metabolic Panel  -     TSH  -     T4, Free  -     Lipid Panel  -     Vitamin B6  -     Vitamin D,25-Hydroxy  -     Iron Profile    3. Memory loss  -     Vitamin B12  -     CBC & Differential  -     Comprehensive Metabolic Panel  -     TSH  -     T4, Free  -     Lipid Panel  -     Vitamin B6  -     Vitamin D,25-Hydroxy    4. Apnea  -     Ambulatory Referral to Sleep Medicine    5. Restless leg  -     Iron Profile      Return in about 3 months (around 6/27/2025).          There are no Patient Instructions on file for this visit.     Tai Graham MD    Assessment & Plan

## 2025-05-13 ENCOUNTER — OFFICE VISIT (OUTPATIENT)
Dept: SLEEP MEDICINE | Age: 40
End: 2025-05-13
Payer: MEDICAID

## 2025-05-13 VITALS
WEIGHT: 200 LBS | SYSTOLIC BLOOD PRESSURE: 128 MMHG | BODY MASS INDEX: 27.09 KG/M2 | HEIGHT: 72 IN | TEMPERATURE: 96.9 F | HEART RATE: 85 BPM | DIASTOLIC BLOOD PRESSURE: 80 MMHG | OXYGEN SATURATION: 97 %

## 2025-05-13 DIAGNOSIS — F51.04 CHRONIC INSOMNIA: ICD-10-CM

## 2025-05-13 DIAGNOSIS — G47.21 CIRCADIAN RHYTHM SLEEP DISORDER, DELAYED SLEEP PHASE TYPE: Primary | ICD-10-CM

## 2025-05-13 PROBLEM — F41.1 GENERALIZED ANXIETY DISORDER: Status: ACTIVE | Noted: 2023-06-07

## 2025-05-13 PROBLEM — F25.9 SCHIZOAFFECTIVE DISORDER: Status: ACTIVE | Noted: 2023-05-18

## 2025-05-13 PROBLEM — S30.1XXA HEMATOMA OF LEFT FLANK: Status: RESOLVED | Noted: 2023-03-05 | Resolved: 2025-05-13

## 2025-05-13 PROBLEM — R41.89 MODERATE COGNITIVE IMPAIRMENT: Status: ACTIVE | Noted: 2023-05-18

## 2025-05-13 PROBLEM — S06.9XAA TRAUMATIC BRAIN INJURY: Status: ACTIVE | Noted: 2023-03-05

## 2025-05-13 PROBLEM — F39 MOOD DISORDER: Status: ACTIVE | Noted: 2023-03-07

## 2025-05-13 NOTE — PROGRESS NOTES
Kenney Jacob is a 39 y.o. male.   Chief Complaint   Patient presents with    Unable To Stay Asleep    Unable To Fall Asleep    Daytime Sleepiness    Non-restorative Sleep    Dry Mouth    Heartburn    Nightmares       HPI     39 y.o. male seen in consultation at the request of Tai Graham MD for evaluation of the above.     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  He had a subsequent cranioplasty in July 2023.  He has been diagnosed with a traumatic brain injury with some neurocognitive deficits as a result.  He has a mood disorder and schizoaffective disorder/schizophrenia and this predated his TBI.    Since his TBI he reports increasing problems with sleep.    The details are as he relates them to me today is that he will get in bed at around midnight and then have trouble falling asleep and this typically will take him 2 hours to initiate sleep.  Through the remainder of the night he will have about 3 awakenings that last about 30 minutes each.  He will then get out of bed at 8 AM and eat breakfast and then go back to bed and sleep till around noon.    He says he remains awake for the remaining 12 hours and does not nap and is not overly sleepy during that time.    He has been told he snores by his ex-girlfriend but has not been told anything else about his sleep from a third-party standpoint.  He has never woken himself up snoring or gasping for breath.    [place] Scale is: 7/24    The patient's relevant past medical, surgical, family, and social history reviewed and updated in Epic as appropriate.    Current medications are:   Current Outpatient Medications:     B Complex Vitamins (vitamin b complex) capsule capsule, Take  by mouth Daily., Disp: , Rfl:     buPROPion XL (WELLBUTRIN XL) 150 MG 24 hr tablet, Take 1 tablet by mouth Every Morning., Disp: , Rfl:     divalproex (DEPAKOTE) 125 MG DR tablet, Take 1 tablet by mouth 2 (Two) Times a  "Day., Disp: , Rfl:     FLUoxetine (PROzac) 40 MG capsule, Take 1 capsule by mouth Daily., Disp: , Rfl:     ondansetron (ZOFRAN) 4 MG tablet, Take 1 tablet by mouth Every 8 (Eight) Hours As Needed for Nausea., Disp: , Rfl:     propranolol (INDERAL) 10 MG tablet, Take 1 tablet by mouth Every Evening., Disp: , Rfl:     vitamin C (ASCORBIC ACID) 250 MG tablet, Take 6 tablets by mouth Daily., Disp: , Rfl:     ziprasidone (GEODON) 60 MG capsule, Take 1 capsule by mouth 2 (Two) Times a Day., Disp: , Rfl:     cetirizine (zyrTEC) 10 MG tablet, Take 1 tablet by mouth every night at bedtime. (Patient not taking: Reported on 5/13/2025), Disp: , Rfl: .    Review of Systems    Review of Systems  ROS documented in patient questionnaire ×14 systems.  Otherwise negative except as noted in HPI.    Physical Exam    Blood pressure 128/80, pulse 85, temperature 96.9 °F (36.1 °C), temperature source Infrared, height 182.9 cm (72\"), weight 90.7 kg (200 lb), SpO2 97%. Body mass index is 27.12 kg/m².    Physical Exam  Vitals and nursing note reviewed.   Constitutional:       Appearance: Normal appearance. He is well-developed.   HENT:      Head: Normocephalic and atraumatic.      Nose: Nose normal.      Mouth/Throat:      Mouth: Mucous membranes are moist.      Pharynx: Oropharynx is clear. No oropharyngeal exudate.      Comments: Class I airway  Eyes:      General: No scleral icterus.     Conjunctiva/sclera: Conjunctivae normal.   Neck:      Thyroid: No thyromegaly.      Trachea: No tracheal deviation.   Cardiovascular:      Rate and Rhythm: Normal rate and regular rhythm.      Heart sounds: No murmur heard.     No friction rub. No gallop.   Pulmonary:      Effort: Pulmonary effort is normal. No respiratory distress.      Breath sounds: No wheezing or rales.   Musculoskeletal:         General: No deformity. Normal range of motion.   Skin:     General: Skin is warm and dry.      Findings: No rash.   Neurological:      Mental Status: He is " alert and oriented to person, place, and time.   Psychiatric:         Behavior: Behavior normal.         Thought Content: Thought content normal.         DATA:    Reviewed 3/27/2025 note from Dr. Graham    Reviewed 11/1/2023 note from Dr. Day at     ASSESSMENT:    Problem List Items Addressed This Visit       Chronic insomnia    Circadian rhythm sleep disorder, delayed sleep phase type - Primary       39-year-old male referred to the sleep clinic.  He has a history of disturbed sleep as well as TBI and a schizoaffective and mood disorder.    In careful discussion with him it appears his primary issue is likely combination of insomnia and a delayed circadian rhythm sleep disorder.  He does not have any convincing signs or symptoms of an intrinsic sleep disorder such as sleep disordered breathing or a limb movement disorder though those certainly are not out of the question.    I discussed the situation with him and he would like to proceed with trying some techniques to correct his sleep schedule and treat his chronic insomnia and then consider a sleep study if those are not effective.    PLAN:    First of all we talked about basic sleep hygiene techniques and stimulus control therapy and I gave him written instructions in this regard as well as instructions how to download a CBT-I pretty on his phone.  In order to advance his sleep phase he will need to keep a strict wake-up time in the morning and expose himself to full-spectrum light after awakening for at least 30 minutes.  He can also take low-dose melatonin 2-5 mg at night 1 hour prior to his planned bedtime  He should not nap during the day during the above process  We discussed all of this in detail.  If he cannot correct his sleep to his expectations he will let me know and I can reconsult with him and we could certainly consider an HSAT or PSG in the future and we discussed that.    I have reviewed the results of my evaluation and impression and discussed  my recommendations in detail with the patient.    Signed by  Carlos Redding MD    May 13, 2025      CC: Tai Graham MD Geile, Michael A, MD

## 2025-06-27 ENCOUNTER — OFFICE VISIT (OUTPATIENT)
Dept: INTERNAL MEDICINE | Facility: CLINIC | Age: 40
End: 2025-06-27
Payer: MEDICAID

## 2025-06-27 VITALS
HEART RATE: 67 BPM | HEIGHT: 72 IN | SYSTOLIC BLOOD PRESSURE: 120 MMHG | OXYGEN SATURATION: 96 % | DIASTOLIC BLOOD PRESSURE: 80 MMHG | TEMPERATURE: 96.7 F | RESPIRATION RATE: 16 BRPM | WEIGHT: 197 LBS | BODY MASS INDEX: 26.68 KG/M2

## 2025-06-27 DIAGNOSIS — S06.9XAS TRAUMATIC BRAIN INJURY, WITH UNKNOWN LOSS OF CONSCIOUSNESS STATUS, SEQUELA: Primary | ICD-10-CM

## 2025-06-27 DIAGNOSIS — F25.9 SCHIZO AFFECTIVE SCHIZOPHRENIA: ICD-10-CM

## 2025-06-27 RX ORDER — VALBENAZINE 40 MG/1
1 CAPSULE ORAL DAILY
COMMUNITY
Start: 2025-06-06

## 2025-06-27 RX ORDER — DIVALPROEX SODIUM 250 MG/1
250 TABLET, DELAYED RELEASE ORAL 2 TIMES DAILY
COMMUNITY
Start: 2025-06-16

## 2025-06-27 NOTE — PROGRESS NOTES
"Subjective     Patient ID: Kenney Jacob is a 39 y.o. male. Patient is here for management of multiple medical problems.     Chief Complaint   Patient presents with    Traumatic brain injury     History of Present Illness   TBI    Diet going better. Down 11 lbs.   Delusions same.   Off Wellbutrin.       The following portions of the patient's history were reviewed and updated as appropriate: allergies, current medications, past family history, past medical history, past social history, past surgical history and problem list.    Review of Systems    Current Outpatient Medications:     B Complex Vitamins (vitamin b complex) capsule capsule, Take  by mouth Daily., Disp: , Rfl:     divalproex (DEPAKOTE) 250 MG DR tablet, Take 1 tablet by mouth 2 (Two) Times a Day., Disp: , Rfl:     FLUoxetine (PROzac) 40 MG capsule, Take 1 capsule by mouth Daily., Disp: , Rfl:     Ingrezza 40 MG capsule, Take 1 capsule by mouth Daily., Disp: , Rfl:     ondansetron (ZOFRAN) 4 MG tablet, Take 1 tablet by mouth Every 8 (Eight) Hours As Needed for Nausea., Disp: , Rfl:     propranolol (INDERAL) 10 MG tablet, Take 1 tablet by mouth Every Evening., Disp: , Rfl:     vitamin C (ASCORBIC ACID) 250 MG tablet, Take 6 tablets by mouth Daily., Disp: , Rfl:     ziprasidone (GEODON) 60 MG capsule, Take 1 capsule by mouth 2 (Two) Times a Day., Disp: , Rfl:     Objective      Blood pressure 120/80, pulse 67, temperature 96.7 °F (35.9 °C), resp. rate 16, height 182.9 cm (72\"), weight 89.4 kg (197 lb), SpO2 96%.            Physical Exam     General Appearance:    Alert, cooperative, no distress, appears stated age   Head:    Normocephalic, without obvious abnormality, atraumatic   Eyes:    PERRL, conjunctiva/corneas clear, EOM's intact   Ears:    Normal TM's and external ear canals, both ears   Nose:   Nares normal, septum midline, mucosa normal, no drainage   or sinus tenderness   Throat:   Lips, mucosa, and tongue normal; teeth and gums normal   Neck:   " Supple, symmetrical, trachea midline, no adenopathy;        thyroid:  No enlargement/tenderness/nodules; no carotid    bruit or JVD   Back:     Symmetric, no curvature, ROM normal, no CVA tenderness   Lungs:     Clear to auscultation bilaterally, respirations unlabored   Chest wall:    No tenderness or deformity   Heart:    Regular rate and rhythm, S1 and S2 normal, no murmur,        rub or gallop   Abdomen:     Soft, non-tender, bowel sounds active all four quadrants,     no masses, no organomegaly   Extremities:   Extremities normal, atraumatic, no cyanosis or edema   Pulses:   2+ and symmetric all extremities   Skin:   Skin color, texture, turgor normal, no rashes or lesions   Lymph nodes:   Cervical, supraclavicular, and axillary nodes normal   Neurologic:   CNII-XII intact. Normal strength, sensation and reflexes       throughout      Results for orders placed or performed in visit on 05/26/22   Valproic Acid Level, Total    Collection Time: 05/26/22  8:50 AM    Specimen: Blood   Result Value Ref Range    Valproic Acid 77.3 50.0 - 125.0 mcg/mL   Vitamin D 1,25 Dihydroxy    Collection Time: 05/26/22  8:50 AM    Specimen: Blood   Result Value Ref Range    1,25-Dihydroxy, Vitamin D 46.0 19.9 - 79.3 pg/mL   Vitamin B12    Collection Time: 05/26/22  8:50 AM    Specimen: Blood   Result Value Ref Range    Vitamin B-12 1,119 (H) 211 - 946 pg/mL   Comprehensive Metabolic Panel    Collection Time: 05/26/22  8:50 AM    Specimen: Blood   Result Value Ref Range    Glucose 113 (H) 65 - 99 mg/dL    BUN 13 6 - 20 mg/dL    Creatinine 1.11 0.76 - 1.27 mg/dL    Sodium 141 136 - 145 mmol/L    Potassium 3.8 3.5 - 5.2 mmol/L    Chloride 105 98 - 107 mmol/L    CO2 23.0 22.0 - 29.0 mmol/L    Calcium 8.8 8.6 - 10.5 mg/dL    Total Protein 5.7 (L) 6.0 - 8.5 g/dL    Albumin 4.10 3.50 - 5.20 g/dL    ALT (SGPT) 43 (H) 1 - 41 U/L    AST (SGOT) 29 1 - 40 U/L    Alkaline Phosphatase 68 39 - 117 U/L    Total Bilirubin 0.9 0.0 - 1.2 mg/dL     Globulin 1.6 gm/dL    A/G Ratio 2.6 g/dL    BUN/Creatinine Ratio 11.7 7.0 - 25.0    Anion Gap 13.0 5.0 - 15.0 mmol/L    eGFR 87.2 >60.0 mL/min/1.73   Magnesium    Collection Time: 05/26/22  8:50 AM    Specimen: Blood   Result Value Ref Range    Magnesium 2.0 1.6 - 2.6 mg/dL   CBC Auto Differential    Collection Time: 05/26/22  8:50 AM    Specimen: Blood   Result Value Ref Range    WBC 5.99 3.40 - 10.80 10*3/mm3    RBC 4.96 4.14 - 5.80 10*6/mm3    Hemoglobin 14.6 13.0 - 17.7 g/dL    Hematocrit 42.9 37.5 - 51.0 %    MCV 86.5 79.0 - 97.0 fL    MCH 29.4 26.6 - 33.0 pg    MCHC 34.0 31.5 - 35.7 g/dL    RDW 12.6 12.3 - 15.4 %    RDW-SD 39.5 37.0 - 54.0 fl    MPV 10.7 6.0 - 12.0 fL    Platelets 144 140 - 450 10*3/mm3    Neutrophil % 40.7 (L) 42.7 - 76.0 %    Lymphocyte % 36.4 19.6 - 45.3 %    Monocyte % 12.4 (H) 5.0 - 12.0 %    Eosinophil % 9.5 (H) 0.3 - 6.2 %    Basophil % 0.8 0.0 - 1.5 %    Immature Grans % 0.2 0.0 - 0.5 %    Neutrophils, Absolute 2.44 1.70 - 7.00 10*3/mm3    Lymphocytes, Absolute 2.18 0.70 - 3.10 10*3/mm3    Monocytes, Absolute 0.74 0.10 - 0.90 10*3/mm3    Eosinophils, Absolute 0.57 (H) 0.00 - 0.40 10*3/mm3    Basophils, Absolute 0.05 0.00 - 0.20 10*3/mm3    Immature Grans, Absolute 0.01 0.00 - 0.05 10*3/mm3    nRBC 0.0 0.0 - 0.2 /100 WBC   TSH Rfx On Abnormal To Free T4    Collection Time: 05/26/22  8:50 AM    Specimen: Blood   Result Value Ref Range    TSH 1.710 0.270 - 4.200 uIU/mL         Assessment & Plan   Needs to get labs done        Diagnoses and all orders for this visit:    1. Traumatic brain injury, with unknown loss of consciousness status, sequela (Primary)  -     Ambulatory Referral to Psychiatry    2. Schizo affective schizophrenia  -     Ambulatory Referral to Psychiatry      No follow-ups on file.          There are no Patient Instructions on file for this visit.     Tai Graham MD    Assessment & Plan

## 2025-08-22 ENCOUNTER — OFFICE VISIT (OUTPATIENT)
Dept: INTERNAL MEDICINE | Facility: CLINIC | Age: 40
End: 2025-08-22
Payer: MEDICAID

## 2025-08-22 VITALS
OXYGEN SATURATION: 97 % | HEIGHT: 72 IN | TEMPERATURE: 96.4 F | BODY MASS INDEX: 24.79 KG/M2 | HEART RATE: 65 BPM | SYSTOLIC BLOOD PRESSURE: 104 MMHG | DIASTOLIC BLOOD PRESSURE: 78 MMHG | RESPIRATION RATE: 16 BRPM | WEIGHT: 183 LBS

## 2025-08-22 DIAGNOSIS — G47.33 OSA (OBSTRUCTIVE SLEEP APNEA): Primary | ICD-10-CM

## 2025-08-22 DIAGNOSIS — S06.9X9D TRAUMATIC BRAIN INJURY WITH LOSS OF CONSCIOUSNESS, SUBSEQUENT ENCOUNTER: ICD-10-CM

## 2025-08-22 RX ORDER — DIVALPROEX SODIUM 125 MG/1
125 TABLET, DELAYED RELEASE ORAL 2 TIMES DAILY
COMMUNITY
Start: 2025-07-07